# Patient Record
Sex: FEMALE | Race: WHITE | NOT HISPANIC OR LATINO | Employment: FULL TIME | URBAN - METROPOLITAN AREA
[De-identification: names, ages, dates, MRNs, and addresses within clinical notes are randomized per-mention and may not be internally consistent; named-entity substitution may affect disease eponyms.]

---

## 2017-01-09 ENCOUNTER — OFFICE VISIT (OUTPATIENT)
Dept: OBSTETRICS AND GYNECOLOGY | Facility: CLINIC | Age: 27
End: 2017-01-09
Payer: COMMERCIAL

## 2017-01-09 VITALS
SYSTOLIC BLOOD PRESSURE: 106 MMHG | DIASTOLIC BLOOD PRESSURE: 64 MMHG | WEIGHT: 161.81 LBS | HEIGHT: 70 IN | BODY MASS INDEX: 23.17 KG/M2

## 2017-01-09 DIAGNOSIS — N89.8 VAGINAL DISCHARGE: ICD-10-CM

## 2017-01-09 DIAGNOSIS — Z30.41 ENCOUNTER FOR SURVEILLANCE OF CONTRACEPTIVE PILLS: ICD-10-CM

## 2017-01-09 DIAGNOSIS — Z01.419 WOMEN'S ANNUAL ROUTINE GYNECOLOGICAL EXAMINATION: Primary | ICD-10-CM

## 2017-01-09 LAB
B-HCG UR QL: NEGATIVE
CTP QC/QA: YES

## 2017-01-09 PROCEDURE — 99213 OFFICE O/P EST LOW 20 MIN: CPT | Performed by: NURSE PRACTITIONER

## 2017-01-09 PROCEDURE — 81025 URINE PREGNANCY TEST: CPT | Mod: S$GLB,,, | Performed by: NURSE PRACTITIONER

## 2017-01-09 PROCEDURE — 99385 PREV VISIT NEW AGE 18-39: CPT | Mod: S$GLB,,, | Performed by: NURSE PRACTITIONER

## 2017-01-09 PROCEDURE — 87480 CANDIDA DNA DIR PROBE: CPT

## 2017-01-09 RX ORDER — NORETHINDRONE ACETATE AND ETHINYL ESTRADIOL .03; 1.5 MG/1; MG/1
1 TABLET ORAL DAILY
Qty: 28 EACH | Refills: 11 | Status: SHIPPED | OUTPATIENT
Start: 2017-01-09 | End: 2017-10-24 | Stop reason: SDUPTHER

## 2017-01-09 NOTE — PROGRESS NOTES
CC: Annual  HPI: Pt is a 26 y.o.  female who presents for routine annual exam. She uses OCPs for contraception. She does not want STD screening. 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 Wants to establish care with [employer] [facility]. C/o a vaginal itch. Denies odor. Denies abnormal vaginal discharge. Pt is on Loestrin-does not get cycles. Last pap WNL in 1/2016. No other issues or complaints.       ROS:  GENERAL: Feeling well overall. Denies fever or chills.   SKIN: Denies rash or lesions.   HEAD: Denies head injury or headache.   NODES: Denies enlarged lymph nodes.   CHEST: Denies chest pain or shortness of breath.   CARDIOVASCULAR: Denies palpitations or left sided chest pain.   ABDOMEN: No abdominal pain, constipation, diarrhea, nausea, vomiting or rectal bleeding.   URINARY: No dysuria, hematuria, or burning on urination.  REPRODUCTIVE: See HPI.   BREASTS: Denies pain, lumps, or nipple discharge.   HEMATOLOGIC: No easy bruisability or excessive bleeding.   MUSCULOSKELETAL: Denies joint pain or swelling.   NEUROLOGIC: Denies syncope or weakness.   PSYCHIATRIC: Denies depression, anxiety or mood swings.    PE:   APPEARANCE: Well nourished, well developed, White female in no acute distress.  NODES: no cervical, supraclavicular, or inguinal lymphadenopathy  BREASTS: Symmetrical, no skin changes or visible lesions. No palpable masses, nipple discharge or adenopathy bilaterally.  ABDOMEN: Soft. No tenderness or masses. No distention. No hernias palpated. No CVA tenderness.  VULVA: No lesions. Normal external female genitalia.  URETHRAL MEATUS: Normal size and location, no lesions, no prolapse.  URETHRA: No masses, tenderness, or prolapse.  VAGINA: Moist. No lesions or lacerations noted. + thick white discharge. No odor present.   CERVIX: No lesions or discharge. No cervical motion tenderness.   UTERUS: Normal size, regular shape, mobile, non-tender.  ADNEXA: No tenderness. No fullness or masses palpated in the adnexal regions.    ANUS PERINEUM: Normal.      Diagnosis:  1. Women's annual routine gynecological examination    2. Vaginal discharge    3. Encounter for surveillance of contraceptive pills        Plan:     Orders Placed This Encounter    Vaginosis Screen by DNA Probe    POCT urine pregnancy    norethindrone ac-eth estradiol (LOESTRIN 1.5/30, 21,) 1.5-30 mg-mcg Tab     UPT negative  Affirm pending  Discussed pap guidelines-due in 1/2019  Rx refill of OCPs  The use of the oral contraceptive has been fully discussed with the patient. This includes the proper method to initiate and continue the pills, the need for regular compliance to ensure adequate contraceptive effect, the physiology which make the pill effective, the instructions for what to do in event of a missed pill, and warnings about anticipated minor side effects such as breakthrough spotting, nausea, breast tenderness, weight changes, acne, headaches, etc.  She has been told of the more serious potential side effects such as MI, stroke, and deep vein thrombosis, all of which are very unlikely.  She has been asked to report any signs of such serious problems immediately.  She should back up the pill with a condom during any cycle in which antibiotics are prescribed, and during the first cycle as well. The need for additional protection, such as a condom, to prevent exposure to sexually transmitted diseases has also been discussed- the patient has been clearly reminded that OCP's cannot protect her against diseases such as HIV and others. She understands and wishes to take the medication as prescribed.     Patient was counseled today on the new ACS guidelines for cervical cytology screening as well as the current recommendations for breast cancer screening. She was counseled to follow up with her PCP for other routine health maintenance. Counseling session lasted approximately 10 minutes, and all her questions were answered.    Follow-up with me in 1 year for routine  exam; pap in 1 year.

## 2017-01-10 ENCOUNTER — TELEPHONE (OUTPATIENT)
Dept: OBSTETRICS AND GYNECOLOGY | Facility: CLINIC | Age: 27
End: 2017-01-10

## 2017-01-10 ENCOUNTER — PATIENT MESSAGE (OUTPATIENT)
Dept: OBSTETRICS AND GYNECOLOGY | Facility: CLINIC | Age: 27
End: 2017-01-10

## 2017-01-10 DIAGNOSIS — N76.0 BV (BACTERIAL VAGINOSIS): Primary | ICD-10-CM

## 2017-01-10 DIAGNOSIS — B96.89 BV (BACTERIAL VAGINOSIS): Primary | ICD-10-CM

## 2017-01-10 DIAGNOSIS — B37.31 VAGINAL YEAST INFECTION: ICD-10-CM

## 2017-01-10 LAB
CANDIDA RRNA VAG QL PROBE: POSITIVE
G VAGINALIS RRNA GENITAL QL PROBE: POSITIVE
T VAGINALIS RRNA GENITAL QL PROBE: NEGATIVE

## 2017-01-10 RX ORDER — TINIDAZOLE 500 MG/1
2 TABLET ORAL DAILY
Qty: 8 TABLET | Refills: 0 | Status: SHIPPED | OUTPATIENT
Start: 2017-01-10 | End: 2017-01-10 | Stop reason: CLARIF

## 2017-01-10 RX ORDER — TINIDAZOLE 500 MG/1
2 TABLET ORAL DAILY
Qty: 8 TABLET | Refills: 0 | Status: SHIPPED | OUTPATIENT
Start: 2017-01-10 | End: 2017-01-12

## 2017-01-10 RX ORDER — FLUCONAZOLE 150 MG/1
150 TABLET ORAL ONCE
Qty: 1 TABLET | Refills: 0 | Status: SHIPPED | OUTPATIENT
Start: 2017-01-10 | End: 2017-01-10

## 2017-01-10 RX ORDER — FLUCONAZOLE 150 MG/1
150 TABLET ORAL ONCE
Qty: 1 TABLET | Refills: 0 | Status: SHIPPED | OUTPATIENT
Start: 2017-01-10 | End: 2017-01-10 | Stop reason: CLARIF

## 2017-01-10 NOTE — TELEPHONE ENCOUNTER
----- Message from Lila Lino NP sent at 1/10/2017  8:32 AM CST -----  Regarding: rx to pharmacy  Please let pt know she is + for yeast and BV. I sent in Diflucan and tindamax to the [facility] on [address]. Take one pill of diflucan once. Take 4 pills of Tindamax on day 1 and four pills on day 2. No alcohol with Tindamax.     Thanks  Lila

## 2017-02-20 ENCOUNTER — HOSPITAL ENCOUNTER (OUTPATIENT)
Dept: RADIOLOGY | Facility: HOSPITAL | Age: 27
Discharge: HOME | End: 2017-02-20
Attending: FAMILY MEDICINE
Payer: COMMERCIAL

## 2017-02-20 ENCOUNTER — OFFICE VISIT (OUTPATIENT)
Dept: SPORTS MEDICINE | Facility: CLINIC | Age: 27
End: 2017-02-20
Payer: COMMERCIAL

## 2017-02-20 VITALS — HEIGHT: 70 IN | BODY MASS INDEX: 23.05 KG/M2 | WEIGHT: 161 LBS | TEMPERATURE: 98 F

## 2017-02-20 DIAGNOSIS — M25.562 LEFT KNEE PAIN, UNSPECIFIED CHRONICITY: ICD-10-CM

## 2017-02-20 DIAGNOSIS — M25.562 PATELLOFEMORAL ARTHRALGIA OF LEFT KNEE: Primary | ICD-10-CM

## 2017-02-20 PROCEDURE — 99999 PR PBB SHADOW E&M-EST. PATIENT-LVL III: CPT | Mod: PBBFAC,,, | Performed by: FAMILY MEDICINE

## 2017-02-20 PROCEDURE — 99203 OFFICE O/P NEW LOW 30 MIN: CPT | Mod: S$GLB,,, | Performed by: FAMILY MEDICINE

## 2017-02-20 PROCEDURE — 73564 X-RAY EXAM KNEE 4 OR MORE: CPT | Mod: TC,PO,LT

## 2017-02-20 PROCEDURE — 73562 X-RAY EXAM OF KNEE 3: CPT | Mod: 26,59,RT, | Performed by: RADIOLOGY

## 2017-02-20 PROCEDURE — 73564 X-RAY EXAM KNEE 4 OR MORE: CPT | Mod: 26,LT,, | Performed by: RADIOLOGY

## 2017-02-20 NOTE — MR AVS SNAPSHOT
[facility]  [address]  [facility] [address]  Phone: [phone]                  Norma Manjarrez   2017 2:30 PM   Appointment    Description:  Female : 1990   Provider:  Justus Venegas MD   Department:  [facility]                To Do List           Future Appointments        Provider Department Dept Phone    2017 2:30 PM Justus Venegas MD [facility] [phone]      Goals (5 Years of Data)     None      [facility]     [facility] Line -  Assistance  Registered nurses in the [facility] provide clinical advisement, health education, appointment booking, and other advisory services.  Call for this free service at [phone].             Medications           Message regarding Medications     Verify the changes and/or additions to your medication regime listed below are the same as discussed with your clinician today.  If any of these changes or additions are incorrect, please notify your healthcare provider.             Verify that the below list of medications is an accurate representation of the medications you are currently taking.  If none reported, the list may be blank. If incorrect, please contact your healthcare provider. Carry this list with you in case of emergency.           Current Medications     clonazePAM (KLONOPIN) 0.5 MG tablet Take 1 tablet (0.5 mg total) by mouth 2 (two) times daily as needed for Anxiety.    norethindrone ac-eth estradiol (LOESTRIN 1.5, 21,) 1.5-30 mg-mcg Tab Take 1 tablet by mouth once daily.    venlafaxine (EFFEXOR-XR) 37.5 MG 24 hr capsule Take 1 capsule (37.5 mg total) by mouth once daily.           Clinical Reference Information           Allergies as of 2017     No Known Allergies      Immunizations Administered on Date of Encounter - 2017     None      Language Assistance Services     ATTENTION: Language assistance  services are available, free of charge. Please call [phone].      ATENCIÓN: Si habla amor, tiene a veras disposición servicios gratuitos de asistencia lingüística. Llame al [phone].     CHÚ Ý: N?u b?n nói Ti?ng Vi?t, có các d?ch v? h? tr? ngôn ng? mi?n phí dành cho b?n. G?i s? [phone].         [facility] complies with applicable Federal civil rights laws and does not discriminate on the basis of race, color, national origin, age, disability, or sex.

## 2017-02-20 NOTE — PROGRESS NOTES
Norma Manjarrez, a 26 y.o. female, presents today for evaluation of her LEFT knee.      HISTORY OF PRESENT ILLNESS   Location: anterior knee, left  Onset: insidious, November 2016  Palliative:    Relative rest   Oral analgesics   Yoga   Self stretching   Cycling  Provocative:    Running  Prior: none  Progression: plateau discomfort  Quality:    Sharp when running   Ache day following a run   Radiation: none  Severity: per nursing documentation  Timing: intermittent w/ use  Trauma:    November 2016: ran half marathon --> pain following    Review of systems (ROS):  A 10+ review of systems was performed with pertinent positives and negatives noted above in the history of present illness. Other systems were negative unless otherwise specified.    PHYSICAL EXAMINATION  General:  The patient is alert and oriented x 3. Mood is pleasant. Observation of ears, eyes and nose reveal no gross abnormalities. HEENT: NCAT, sclera anicteric.   Lungs: Respirations are equal and unlabored.  Gait is coordinated. Patient can toe walk and heel walk without difficulty.    LEFT KNEE EXAMINATION    Observation/Inspection  Gait:   Nonantalgic   Alignment:  Neutral   Scars:   None   Muscle atrophy: Mild  Effusion:  None   Warmth:  None   Discoloration:   none     Tenderness / Crepitus (T / C):         T / C      T / C  Patella   - / -   Lateral joint line   - / -     Peripatellar medial  +  Medial joint line    - / -  Peripatellar lateral +  Medial plica   - / -  Patellar tendon +   Popliteal fossa   - / -  Quad tendon   -   Gastrocnemius   -  Prepatellar Bursa + / +   Quadricep   -  Tibial tubercle  -  Thigh/hamstring  -  Pes anserine/HS -  Fibula    -  ITB   - / -  Tibia     -  Tib/fib joint  - / -  LCL    -    MFC   - / -   MCL: Proximal  -    LFC   - / -   Distal    -          ROM: (* = pain)  PASSIVE   ACTIVE    Left :   5 / 0 / 145   5 / 0 / 145     Right :    5 / 0 / 145   5 / 0 / 145    Patellofemoral examination:  See  above noted areas of tenderness.   Patella position    Subluxation / dislocation: Centered        Sup. / Inf;   Normal   Crepitus (PF):    Absent   Patellar Mobility:       Medial-lateral:   Normal    Superior-inferior:  Normal    Inferior tilt   Normal    Patellar tendon:  Normal   Lateral tilt:    Normal   J-sign:     None   Patellofemoral grind:   No pain     Meniscal Signs:     Pain on terminal extension:  -  Pain on terminal flexion:  -  Micheles maneuver:  -  Squat     NT    Ligament Examination:  ACL / Lachman:  WNL  PCL-Post.  drawer: normal 0 to 2mm  MCL- Valgus:  normal 0 to 2mm  LCL- Varus:    normal 0 to 2mm  Pivot shift:  guarding   Dial Test:   difference c/w other side   At 30° flexion: normal (< 5°)    At 90° flexion: normal (< 5°)   Reverse Pivot Shift:   normal (Equal)     Strength: (* = with pain) Painful Side  Quadriceps   4/5  Hamstrin/5    Extremity Neuro-vascular Examination:   Sensation:  Grossly intact to light touch all dermatomal regions.   Motor Function:  Fully intact motor function at hip, knee, foot and ankle    DTRs;  quadriceps and  achilles 2+.  No clonus and downgoing Babinski.    Vascular status:  DP and PT pulses 2+, brisk capillary refill, symmetric.     Other Findings:    ASSESSMENT & PLAN  Assessment:   #1 PFS, left    No evidence of neurologic pathology  No evidence of vascular pathology    Imaging studies reviewed:   X-ray knee, bilateral 17.02    Plan:      We discussed options including:  #1 watchful waiting  #2 physical therapy aimed at:   Core stability   RoM knee   Strengthening quadriceps   Gait training   #3 injection therapy:   CSI iaknee     Right,     Left,    VSI iaknee    Right,     Left,    Orthobiologics   #4 MRI for further evaluation   #5 consultation      The patient chooses #2    Pain management: handout given  Bracing:    Patellar stability brace, consider  Physical therapy:    fPT, @ [facility], begin as above  Activity (e.g. 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  Ineffective-->consider MRI  Should symptoms worsen or fail to resolve, consider:  Revisiting the above options

## 2017-03-07 ENCOUNTER — CLINICAL SUPPORT (OUTPATIENT)
Dept: REHABILITATION | Facility: HOSPITAL | Age: 27
End: 2017-03-07
Attending: FAMILY MEDICINE
Payer: COMMERCIAL

## 2017-03-07 DIAGNOSIS — M25.562 LEFT KNEE PAIN, UNSPECIFIED CHRONICITY: ICD-10-CM

## 2017-03-07 PROCEDURE — 97110 THERAPEUTIC EXERCISES: CPT

## 2017-03-07 PROCEDURE — 97161 PT EVAL LOW COMPLEX 20 MIN: CPT

## 2017-03-07 NOTE — PROGRESS NOTES
Physical Therapy Evaluation    Name: Norma Thomas [place]  Clinic Number: [id number]      Diagnosis:   Encounter Diagnosis   Name Primary?    Left knee pain, unspecified chronicity      Physician: Justus Venegas, *  Treatment Orders: PT Eval and Treat    No past medical history on file.  Current Outpatient Prescriptions   Medication Sig    clonazePAM (KLONOPIN) 0.5 MG tablet Take 1 tablet (0.5 mg total) by mouth 2 (two) times daily as needed for Anxiety.    norethindrone ac-eth estradiol (LOESTRIN 1.5/30, 21,) 1.5-30 mg-mcg Tab Take 1 tablet by mouth once daily.    venlafaxine (EFFEXOR-XR) 37.5 MG 24 hr capsule Take 1 capsule (37.5 mg total) by mouth once daily.     No current facility-administered medications for this visit.      Review of patient's allergies indicates:  No Known Allergies  Precautions: N/A    Evaluation Date: 3/7/2017  Visit # authorized: 1/30  Authorization period: 12/31/2017    Subjective     Onset/GRIS: insidious    Primary concern/ Chief complaints:    Norma is a 26 y.o. female that presents to [facility] secondary to left knee pain.  Pt states in December she was training for a half marathon in January and was trying to increase her speed when her knee began hurting both during and after her runs.  She would push through it but eventually it was so severe she had to stop running.   X-ray was taken and revealed slight medial tibiofemoral joint space narrowing and right lateral patella tilt but no significant damage is noted. Pt uses ibuprofen and ice as needed to control symptoms but it has not really been bothering her lately as long as she does not run.  She tried a couple weeks ago to start again and it still hurt so she stopped.  She also cross-trains with biking, TRX, resistance training and occasionally yoga. Pt has a decreased ability to perform running as desired, stairs (especially descending) and has a race in April that she would really like  to run. Pt works as a  for [employer] and job related duties include a lot of walking around the facilities. Pt denies numbness and tingling in left LE. No cultural, environmental, or spiritual barriers identified to treatment or learning.    Pain Scale: Norma rates pain on a scale of 0-10 to be 5 at worst; 1 currently; 1 at best .    Aggravates: Descending stairs, running  Relieves: Rest, avoiding painful activities    Patient Goals: Return to running, hopefully in August    Objective     Observation: Patient ambulates with normal gait pattern and no evidence of pain.  Her shoes are [employer]ics with dual foam stability medially.    Posture:  Patient stands with slight femoral internal rotation bilaterally which corrects with glute activation.    Functional tests:    SL Squat: Significant knee valgus L>R, femoral internal rotation and instability noted.   DL Squat: Quad dominance with bilateral external rotation noted.   Step-down: Significant knee valgus L>R, femoral internal rotation and instability noted.  Unable to correct with cuing for glute control.   SLS EO: Good, (+) Trendelenburg, internal femoral rotation L>R.   SLS EC: Fair, less than 10s.    Range of Motion:   Knee Right active Left Active   Flexion WNL WNL   Extension WNL WNL     Lower Extremity Strength:  Right LE  Left LE    Quadriceps: 4+/5 Quadriceps: 4+/5   Hamstrings: 4/5 Hamstrings: 3+/5   Hip flexion (supine): 4/5 Hip flexion (supine): 4/5   Hip extension:  3/5 Hip extension: 3/5   Posterior Glute Med: 3/5 PGM:  3/5     Special Tests:   Right Left   Lateral Pull Sign (+) Slight (+)   Thessaly's Test (-) (-)   Patellar Grind Test (-) (-) pain, mild crepitus noted     Joint Mobility: Mildly decreased medial patellar glide left knee, normal superior/inferior.    Palpation: Mild point tenderness noted inferior medial border of patella.    Sensation: Intact.    Flexibility:    Ely's test: R = Normal ; L = Normal    Hamstrings: R = Normal  ; L = Slightly decreased    Delano's test: R = Normal ; L = Normal   Modified Ant test: R = Slightly decreased quad ; L = Slightly decreased quad    Edema: None noted.    Functional Limitations Reports   Category: Mobility  Tool: FOTO Knee 66%    PT Evaluation Completed? Yes  Discussed Plan of Care with patient: Yes    TREATMENT:  Norma received therapeutic exercises to develop strength and endurance, flexibility for 30 minutes including:   SLR w/ ER 20xB  Sidelying Abd 20xB  Supine Clam GTB 20x  Sidelying Clam GTB 20xB  SL Bridge 5sh x 20B    Pt. Received cold pack with compression x 10 min following treatment.    Instructed pt. regarding: Proper technique with all exercises. Pt demonstrated good understanding of the education provided. Norma demonstrated good return demonstration of activities.    Assessment     This is a 26 y.o. female referred to outpatient physical therapy and presents with a medical diagnosis of left knee pain.  Patient presents with signs and symptoms consistent with patellofemoral dysfunction exacerbated by lateral patellar compression caused by poor hip/glute strength and control with running.  Patient's chief complaint is inability to run.  Patient will benefit from skilled physical therapy services, specifically gross lower extremity strengthening, improved dynamic balance and proprioception, progressing to running analysis as tolerated with the resolution of pain. The following goals were discussed with the patient and patient is in agreement with them as to be addressed in the treatment plan. Patient was given a HEP consisting of exercises listed above. Patient verbally understood the instructions as they were given and demonstrated proper form and technique during therapy. Pt was advised to perform these exercises free of pain, and to stop performing them if pain occurs.     History  Co-morbidities and personal factors that may impact the plan of care Examination  Body  Structures and Functions, activity limitations and participation restrictions that may impact the plan of care Clinical Presentation   Decision Making/ Complexity Score   Co-morbidities:   N/A              Personal Factors:   N/A Body Regions:  Knee and Hip    Body Systems:   Weakness  Decreased balance/proprioception    Activity limitations:   Stairs    Participation Restrictions:   Unable to run       Uncomplicated/Stable   Low       Medical necessity is demonstrated by the following IMPAIRMENTS/PROBLEM LIST:   1)Increase in pain level limiting function   2)Decreased strength   3)Decreased dynamic balance/proprioception   4)Decreased ability to run   5)Lack of HEP    GOALS: Short Term Goals:  4-6 weeks  1.  Report decreased left knee pain  < / =  2/10  to increase tolerance for return to running progression.  2.  Demonstrate single limb squat with proper biomechanics in order to safely return to daily and recreational activities.  3.  Increase strength to >/= 4+/5 in gross bilateral LEs to increase tolerance for return to running.  4.  Pt to tolerate HEP to improve strength and return to running and recreational fitness activities as tolerated.    Plan     Pt will be treated by physical therapy 1-2 times a week for Pt Education, HEP, therapeutic exercises, neuromuscular re-education, manual therapy, joint mobilizations, modalities prn to achieve established goals. Norma may at times be seen by a PTA as part of the Rehab Team.     Cont PT for 4-6 weeks.     Saira Cabrera, PT, DPT        I certify the need for these services furnished under this plan of treatment and while under my care.______________________________ Physician/Referring Practitioner  Date of Signature

## 2017-03-13 ENCOUNTER — PATIENT MESSAGE (OUTPATIENT)
Dept: OBSTETRICS AND GYNECOLOGY | Facility: CLINIC | Age: 27
End: 2017-03-13

## 2017-03-14 ENCOUNTER — CLINICAL SUPPORT (OUTPATIENT)
Dept: REHABILITATION | Facility: HOSPITAL | Age: 27
End: 2017-03-14
Attending: FAMILY MEDICINE
Payer: COMMERCIAL

## 2017-03-14 DIAGNOSIS — M25.562 LEFT KNEE PAIN, UNSPECIFIED CHRONICITY: ICD-10-CM

## 2017-03-14 PROCEDURE — 97110 THERAPEUTIC EXERCISES: CPT

## 2017-03-14 NOTE — PROGRESS NOTES
"Physical Therapy Visit Note    Name: Norma Thomas [place]  Clinic Number: [id number]      Diagnosis:   Encounter Diagnosis   Name Primary?    Left knee pain, unspecified chronicity      Physician: Justus Venegas, *  Treatment Orders: PT Eval and Treat    No past medical history on file.  Current Outpatient Prescriptions   Medication Sig    clonazePAM (KLONOPIN) 0.5 MG tablet Take 1 tablet (0.5 mg total) by mouth 2 (two) times daily as needed for Anxiety.    norethindrone ac-eth estradiol (LOESTRIN 1.5/30, 21,) 1.5-30 mg-mcg Tab Take 1 tablet by mouth once daily.    venlafaxine (EFFEXOR-XR) 37.5 MG 24 hr capsule Take 1 capsule (37.5 mg total) by mouth once daily.     No current facility-administered medications for this visit.      Review of patient's allergies indicates:  No Known Allergies  Precautions: N/A    Evaluation Date: 3/14/2017  Visit # authorized: 2/30  Authorization period: 12/31/2017  Time In: 730  Time Out: 830    Subjective     Patient states her knee was a little sore last week but she thinks she worked out too hard.  It is feeling fine today and her exercises are going well.    Patient Goals: Return to running, hopefully in August    Objective     Functional Limitations Reports   Category: Mobility  Tool: FOTO Knee 66%    TREATMENT:  Norma received therapeutic exercises to develop strength and endurance, flexibility for 55 minutes including:   Bike L3/10'  SL Clam BTB 10x2B  Sidestepping BTB 40'x2 Straight/Bent  Partial Range Shuttle Squats 75# 20x2  3" Lateral Step Downs 20xB (cues for proper glute control)  TKE Ball/Wall 5sh x 20B  Standing HS Stretch 2'B  Standing Gastroc Stretch 2'B    Pt. Received cold pack with compression x 10 min following treatment. (Patient refused--not today)    Instructed pt. regarding: Proper technique with all exercises. Pt demonstrated good understanding of the education provided. Norma demonstrated good return demonstration of " activities.    Assessment     Today's Assessment:  Patient stated she was a little sore after today's treatment but refused ice.  She required mod cues for proper glute control for internal femoral rotation.  Progress as tolerated.    This is a 26 y.o. female referred to outpatient physical therapy and presents with a medical diagnosis of left knee pain.  Patient presents with signs and symptoms consistent with patellofemoral dysfunction exacerbated by lateral patellar compression caused by poor hip/glute strength and control with running.  Patient's chief complaint is inability to run.  Patient will benefit from skilled physical therapy services, specifically gross lower extremity strengthening, improved dynamic balance and proprioception, progressing to running analysis as tolerated with the resolution of pain.     Medical necessity is demonstrated by the following IMPAIRMENTS/PROBLEM LIST:   1)Increase in pain level limiting function   2)Decreased strength   3)Decreased dynamic balance/proprioception   4)Decreased ability to run   5)Lack of HEP    GOALS: Short Term Goals:  4-6 weeks  1.  Report decreased left knee pain  < / =  2/10  to increase tolerance for return to running progression.  2.  Demonstrate single limb squat with proper biomechanics in order to safely return to daily and recreational activities.  3.  Increase strength to >/= 4+/5 in gross bilateral LEs to increase tolerance for return to running.  4.  Pt to tolerate HEP to improve strength and return to running and recreational fitness activities as tolerated.    Plan     Pt will be treated by physical therapy 1-2 times a week for Pt Education, HEP, therapeutic exercises, neuromuscular re-education, manual therapy, joint mobilizations, modalities prn to achieve established goals. Norma may at times be seen by a PTA as part of the Rehab Team.     Cont PT for 4-6 weeks.     Saira Cabrera, PT, DPT        I certify the need for these services  furnished under this plan of treatment and while under my care.______________________________ Physician/Referring Practitioner  Date of Signature

## 2017-03-21 ENCOUNTER — CLINICAL SUPPORT (OUTPATIENT)
Dept: REHABILITATION | Facility: HOSPITAL | Age: 27
End: 2017-03-21
Attending: FAMILY MEDICINE
Payer: COMMERCIAL

## 2017-03-21 DIAGNOSIS — M25.562 LEFT KNEE PAIN, UNSPECIFIED CHRONICITY: ICD-10-CM

## 2017-03-21 PROCEDURE — 97110 THERAPEUTIC EXERCISES: CPT

## 2017-03-21 NOTE — PROGRESS NOTES
"Physical Therapy Visit Note    Name: Norma Thomas [place]  Clinic Number: [id number]      Diagnosis:   Encounter Diagnosis   Name Primary?    Left knee pain, unspecified chronicity      Physician: Justus Venegas, *  Treatment Orders: PT Eval and Treat    No past medical history on file.  Current Outpatient Prescriptions   Medication Sig    clonazePAM (KLONOPIN) 0.5 MG tablet Take 1 tablet (0.5 mg total) by mouth 2 (two) times daily as needed for Anxiety.    norethindrone ac-eth estradiol (LOESTRIN 1.5/30, 21,) 1.5-30 mg-mcg Tab Take 1 tablet by mouth once daily.    venlafaxine (EFFEXOR-XR) 37.5 MG 24 hr capsule Take 1 capsule (37.5 mg total) by mouth once daily.     No current facility-administered medications for this visit.      Review of patient's allergies indicates:  No Known Allergies  Precautions: N/A    Today's Date:  3/21/2017  Evaluation Date: 3/14/2017  Visit # authorized: 3/30  Authorization period: 12/31/2017  Time In: 730  Time Out: 830    Subjective     Patient states she went to a workout class on Sunday that ended up being a lot of running so she did it and it has been sore since then but not too bad.  Today she can feel it lateral to patella but not super painful.    Patient Goals: Return to running, hopefully in August    Objective     Functional Limitations Reports   Category: Mobility  Tool: FOTO Knee 66%    TREATMENT:  Norma received therapeutic exercises to develop strength and endurance, flexibility for 55 minutes including:   Bike L5/10'  Shuttle Squats 75# SL 20x2 (very fatigued)  4" Lateral Step Downs 20xB (cues for proper glute control)  4" Forward Step Downs 20xB   Sidestepping BTB 40'x2 Straight/Bent  Monster Walks BTB 40x2 Fwd/Bkwd  SLS on Foam as tolerated  SLS on Foam 3-way ball toss 20x ea.    Patient took ice to go.    Instructed pt. regarding: Proper technique with all exercises. Pt demonstrated good understanding of the education provided. Norma demonstrated " good return demonstration of activities.    Assessment     Today's Assessment:  Patient stated she was a little sore after today's treatment and took ice to go.  She continues to require a few cues for proper LE alignment.    This is a 26 y.o. female referred to outpatient physical therapy and presents with a medical diagnosis of left knee pain.  Patient presents with signs and symptoms consistent with patellofemoral dysfunction exacerbated by lateral patellar compression caused by poor hip/glute strength and control with running.  Patient's chief complaint is inability to run.  Patient will benefit from skilled physical therapy services, specifically gross lower extremity strengthening, improved dynamic balance and proprioception, progressing to running analysis as tolerated with the resolution of pain.     Medical necessity is demonstrated by the following IMPAIRMENTS/PROBLEM LIST:   1)Increase in pain level limiting function   2)Decreased strength   3)Decreased dynamic balance/proprioception   4)Decreased ability to run   5)Lack of HEP    GOALS: Short Term Goals:  4-6 weeks  1.  Report decreased left knee pain  < / =  2/10  to increase tolerance for return to running progression.  2.  Demonstrate single limb squat with proper biomechanics in order to safely return to daily and recreational activities.  3.  Increase strength to >/= 4+/5 in gross bilateral LEs to increase tolerance for return to running.  4.  Pt to tolerate HEP to improve strength and return to running and recreational fitness activities as tolerated.    Plan     Pt will be treated by physical therapy 1-2 times a week for Pt Education, HEP, therapeutic exercises, neuromuscular re-education, manual therapy, joint mobilizations, modalities prn to achieve established goals. Norma may at times be seen by a PTA as part of the Rehab Team.     Cont PT for 4-6 weeks.     Saira Cabrera, PT, DPT        I certify the need for these services furnished  under this plan of treatment and while under my care.______________________________ Physician/Referring Practitioner  Date of Signature

## 2017-03-23 ENCOUNTER — CLINICAL SUPPORT (OUTPATIENT)
Dept: REHABILITATION | Facility: HOSPITAL | Age: 27
End: 2017-03-23
Attending: FAMILY MEDICINE
Payer: COMMERCIAL

## 2017-03-23 DIAGNOSIS — M25.562 LEFT KNEE PAIN, UNSPECIFIED CHRONICITY: ICD-10-CM

## 2017-03-23 PROCEDURE — 97110 THERAPEUTIC EXERCISES: CPT

## 2017-03-23 NOTE — PROGRESS NOTES
"Physical Therapy Visit Note    Name: Norma Thomas [place]  Clinic Number: [id number]      Diagnosis:   Encounter Diagnosis   Name Primary?    Left knee pain, unspecified chronicity      Physician: Justus Venegas, *  Treatment Orders: PT Eval and Treat    No past medical history on file.  Current Outpatient Prescriptions   Medication Sig    clonazePAM (KLONOPIN) 0.5 MG tablet Take 1 tablet (0.5 mg total) by mouth 2 (two) times daily as needed for Anxiety.    norethindrone ac-eth estradiol (LOESTRIN 1.5/30, 21,) 1.5-30 mg-mcg Tab Take 1 tablet by mouth once daily.    venlafaxine (EFFEXOR-XR) 37.5 MG 24 hr capsule Take 1 capsule (37.5 mg total) by mouth once daily.     No current facility-administered medications for this visit.      Review of patient's allergies indicates:  No Known Allergies  Precautions: N/A    Today's Date:  3/23/2017  Evaluation Date: 3/14/2017  Visit # authorized: 4/30  Authorization period: 12/31/2017  Time In: 730  Time Out: 830    Subjective     Patient states her knee was achy after last visit and just felt fatigued but not painful.      Patient Goals: Return to running, hopefully in August    Objective     Functional Limitations Reports   Category: Mobility  Tool: FOTO Knee 66%    TREATMENT:  Norma received therapeutic exercises to develop strength and endurance, flexibility for 55 minutes including:   Bike L5/10'  Standing HS/Gastroc/Hip Flexor Stretch 2'B ea.  SL Bridges 20xB  Bridge w/ Alt Kick 2'  Side Planks w/ Abduction to fatigue x 3B  Side Planks w/ dips to fatigue x 3B  Wall Sits w/ BTB 45"x3 (cues for even weight bearing)  4" Lateral Step Downs 20xB (cues for proper glute control)  Monster Walks BTB 40'x2  SLS on Foam 3-way ball toss 20xB ea.    Not today:  Shuttle Squats 75# SL 20x2 (very fatigued)  4" Forward Step Downs 20xB   Sidestepping BTB 40'x2 Straight/Bent  Monster Walks BTB 40x2 Fwd/Bkwd  SLS on Foam as tolerated    Patient took ice to go.    Instructed " pt. regarding: Proper technique with all exercises. Pt demonstrated good understanding of the education provided. Norma demonstrated good return demonstration of activities.    Assessment     Today's Assessment:  Patient stated she was a little sore after today's treatment and took ice to go.  She continues to require a few cues for proper LE alignment.    This is a 26 y.o. female referred to outpatient physical therapy and presents with a medical diagnosis of left knee pain.  Patient presents with signs and symptoms consistent with patellofemoral dysfunction exacerbated by lateral patellar compression caused by poor hip/glute strength and control with running.  Patient's chief complaint is inability to run.  Patient will benefit from skilled physical therapy services, specifically gross lower extremity strengthening, improved dynamic balance and proprioception, progressing to running analysis as tolerated with the resolution of pain.     Medical necessity is demonstrated by the following IMPAIRMENTS/PROBLEM LIST:   1)Increase in pain level limiting function   2)Decreased strength   3)Decreased dynamic balance/proprioception   4)Decreased ability to run   5)Lack of HEP    GOALS: Short Term Goals:  4-6 weeks  1.  Report decreased left knee pain  < / =  2/10  to increase tolerance for return to running progression.  2.  Demonstrate single limb squat with proper biomechanics in order to safely return to daily and recreational activities.  3.  Increase strength to >/= 4+/5 in gross bilateral LEs to increase tolerance for return to running.  4.  Pt to tolerate HEP to improve strength and return to running and recreational fitness activities as tolerated.    Plan     Pt will be treated by physical therapy 1-2 times a week for Pt Education, HEP, therapeutic exercises, neuromuscular re-education, manual therapy, joint mobilizations, modalities prn to achieve established goals. Norma may at times be seen by a PTA as  part of the Rehab Team.     Cont PT for 4-6 weeks.     Saira Cabrera, PT, DPT        I certify the need for these services furnished under this plan of treatment and while under my care.______________________________ Physician/Referring Practitioner  Date of Signature

## 2017-03-27 ENCOUNTER — CLINICAL SUPPORT (OUTPATIENT)
Dept: REHABILITATION | Facility: HOSPITAL | Age: 27
End: 2017-03-27
Attending: FAMILY MEDICINE
Payer: COMMERCIAL

## 2017-03-27 DIAGNOSIS — M25.562 LEFT KNEE PAIN, UNSPECIFIED CHRONICITY: Primary | ICD-10-CM

## 2017-03-27 PROCEDURE — 97110 THERAPEUTIC EXERCISES: CPT

## 2017-03-27 NOTE — PROGRESS NOTES
"Physical Therapy Visit Note    Name: Norma Thomas [place]  Clinic Number: [id number]      Diagnosis:   Encounter Diagnosis   Name Primary?    Left knee pain, unspecified chronicity Yes     Physician: Justus Venegas, *  Treatment Orders: PT Eval and Treat    No past medical history on file.  Current Outpatient Prescriptions   Medication Sig    clonazePAM (KLONOPIN) 0.5 MG tablet Take 1 tablet (0.5 mg total) by mouth 2 (two) times daily as needed for Anxiety.    norethindrone ac-eth estradiol (LOESTRIN 1.5/30, 21,) 1.5-30 mg-mcg Tab Take 1 tablet by mouth once daily.    venlafaxine (EFFEXOR-XR) 37.5 MG 24 hr capsule Take 1 capsule (37.5 mg total) by mouth once daily.     No current facility-administered medications for this visit.      Review of patient's allergies indicates:  No Known Allergies  Precautions: N/A    Today's Date:  3/23/2017  Evaluation Date: 3/14/2017  Visit # authorized: 5/30  Authorization period: 12/31/2017  Time In: 5:00  Time Out: 6:06    Subjective     Pt reports w/ no c/o pn in L  Knee.      Patient Goals: Return to running, hopefully in August    Objective     Functional Limitations Reports   Category: Mobility  Tool: FOTO Knee 70% 3/27/17    TREATMENT:  Norma received therapeutic exercises to develop strength and endurance, flexibility for 50 minutes including:     Bike L5/5'  Standing HS/Gastroc/Hip Flexor Stretch 2'B ea.  SL Bridges 20xB NP  Bridge w/ Alt Kick 2'  Side Planks w/ Abduction to fatigue x 3B  Side Planks w/ dips to fatigue x 3B  Wall Sits w/ BTB 45"x3 (cues for even weight bearing) NP  4" Lateral Step Downs 30xB (cues for proper glute control)  Monster Walks MTB 40'x2 Fwd/ Bwd   Lat step 1 lap MTB  SLS on Foam 3-way ball toss 20x B ea.    Not today:  Shuttle Squats 75# SL 20x2 (very fatigued)  4" Forward Step Downs 20xB   Sidestepping BTB 40'x2 Straight/Bent  Monster Walks BTB 40x2 Fwd/Bkwd  SLS on Foam as tolerated    CP x 10 min    Instructed pt. regarding: " Proper technique with all exercises. Pt demonstrated good understanding of the education provided. Norma demonstrated good return demonstration of activities.    Assessment     Pt courtney tx well w/ no c/o pn.  Pt demonstrated increased strength during therex w/ VCs for technique.  Pt instructed cont HEP.  Cont to progress as courtney.      This is a 26 y.o. female referred to outpatient physical therapy and presents with a medical diagnosis of left knee pain.  Patient presents with signs and symptoms consistent with patellofemoral dysfunction exacerbated by lateral patellar compression caused by poor hip/glute strength and control with running.  Patient's chief complaint is inability to run.  Patient will benefit from skilled physical therapy services, specifically gross lower extremity strengthening, improved dynamic balance and proprioception, progressing to running analysis as tolerated with the resolution of pain.     Medical necessity is demonstrated by the following IMPAIRMENTS/PROBLEM LIST:   1)Increase in pain level limiting function   2)Decreased strength   3)Decreased dynamic balance/proprioception   4)Decreased ability to run   5)Lack of HEP    GOALS: Short Term Goals:  4-6 weeks  1.  Report decreased left knee pain  < / =  2/10  to increase tolerance for return to running progression.  2.  Demonstrate single limb squat with proper biomechanics in order to safely return to daily and recreational activities.  3.  Increase strength to >/= 4+/5 in gross bilateral LEs to increase tolerance for return to running.  4.  Pt to tolerate HEP to improve strength and return to running and recreational fitness activities as tolerated.    Plan     Pt will be treated by physical therapy 1-2 times a week for Pt Education, HEP, therapeutic exercises, neuromuscular re-education, manual therapy, joint mobilizations, modalities prn to achieve established goals. Norma may at times be seen by a PTA as part of the Rehab Team.      Cont PT for 4-6 weeks.     Roberth Manzano, PTA

## 2017-03-28 ENCOUNTER — OFFICE VISIT (OUTPATIENT)
Dept: SPORTS MEDICINE | Facility: CLINIC | Age: 27
End: 2017-03-28
Payer: COMMERCIAL

## 2017-03-28 VITALS — HEIGHT: 70 IN | BODY MASS INDEX: 23.05 KG/M2 | WEIGHT: 161 LBS | TEMPERATURE: 98 F

## 2017-03-28 DIAGNOSIS — M25.562 PATELLOFEMORAL ARTHRALGIA OF LEFT KNEE: Primary | ICD-10-CM

## 2017-03-28 PROCEDURE — 99214 OFFICE O/P EST MOD 30 MIN: CPT | Mod: S$GLB,,, | Performed by: FAMILY MEDICINE

## 2017-03-28 PROCEDURE — 99999 PR PBB SHADOW E&M-EST. PATIENT-LVL III: CPT | Mod: PBBFAC,,, | Performed by: FAMILY MEDICINE

## 2017-03-28 NOTE — PROGRESS NOTES
Norma Manjarrez, a 26 y.o. female, presents today for evaluation of her LEFT knee.      HISTORY OF PRESENT ILLNESS   Location: anterior knee, left  Onset: insidious, November 2016  Palliative:    Relative rest   Oral analgesics   Yoga   Self stretching   Cycling   fPT @ [facility], Saira Cabrera, going well   Provocative:    Running  Prior: none  Progression: resolving discomfort  Quality:    Sharp when running   Ache day following a run   Radiation: none  Severity: per nursing documentation  Timing: intermittent w/ use  Trauma:    November 2016: ran half marathon --> pain following    Review of systems (ROS):  A 10+ review of systems was performed with pertinent positives and negatives noted above in the history of present illness. Other systems were negative unless otherwise specified.    PHYSICAL EXAMINATION  General:  The patient is alert and oriented x 3. Mood is pleasant. Observation of ears, eyes and nose reveal no gross abnormalities. HEENT: NCAT, sclera anicteric.   Lungs: Respirations are equal and unlabored.  Gait is coordinated. Patient can toe walk and heel walk without difficulty.    LEFT KNEE EXAMINATION    Observation/Inspection  Gait:   Nonantalgic   Alignment:  Neutral   Scars:   None   Muscle atrophy: Mild  Effusion:  None   Warmth:  None   Discoloration:   none     Tenderness / Crepitus (T / C):         T / C      T / C  Patella   - / -   Lateral joint line   - / -     Peripatellar medial  +  Medial joint line    - / -  Peripatellar lateral +  Medial plica   - / -  Patellar tendon +   Popliteal fossa   - / -  Quad tendon   -   Gastrocnemius   -  Prepatellar Bursa + / +   Quadricep   -  Tibial tubercle  -  Thigh/hamstring  -  Pes anserine/HS -  Fibula    -  ITB   - / -  Tibia     -  Tib/fib joint  - / -  LCL    -    MFC   - / -   MCL: Proximal  -    LFC   - / -   Distal    -          ROM: (* = pain)  PASSIVE   ACTIVE    Left :   5 / 0 / 145   5 / 0 / 145     Right :    5 / 0 / 145   5 / 0  / 145    Patellofemoral examination:  See above noted areas of tenderness.   Patella position    Subluxation / dislocation: Centered        Sup. / Inf;   Normal   Crepitus (PF):    Absent   Patellar Mobility:       Medial-lateral:   Normal    Superior-inferior:  Normal    Inferior tilt   Normal    Patellar tendon:  Normal   Lateral tilt:    Normal   J-sign:     None   Patellofemoral grind:   No pain     Meniscal Signs:     Pain on terminal extension:  -  Pain on terminal flexion:  -  Micheles maneuver:  -  Squat     NT    Ligament Examination:  ACL / Lachman:  WNL  PCL-Post.  drawer: normal 0 to 2mm  MCL- Valgus:  normal 0 to 2mm  LCL- Varus:    normal 0 to 2mm  Pivot shift:  guarding   Dial Test:   difference c/w other side   At 30° flexion: normal (< 5°)    At 90° flexion: normal (< 5°)   Reverse Pivot Shift:   normal (Equal)     Strength: (* = with pain) Painful Side  Quadriceps   4/5  Hamstrin/5    Extremity Neuro-vascular Examination:   Sensation:  Grossly intact to light touch all dermatomal regions.   Motor Function:  Fully intact motor function at hip, knee, foot and ankle    DTRs;  quadriceps and  achilles 2+.  No clonus and downgoing Babinski.    Vascular status:  DP and PT pulses 2+, brisk capillary refill, symmetric.     Other Findings:    ASSESSMENT & PLAN  Assessment:   #1 PFS, left    No evidence of neurologic pathology  No evidence of vascular pathology    Imaging studies reviewed:   X-ray knee, bilateral 17.02    Plan:    Discussed full course of fPT vs. hgPT and PT w/     We discussed options including:  #1 watchful waiting  #2 conclude physical therapy aimed at:   Core stability   RoM knee   Strengthening quadriceps   Gait training   #3 injection therapy:   CSI iaknee     Right,     Left,    VSI iaknee    Right,     Left,    Orthobiologics   #4 MRI for further evaluation   #5 consultation      The patient chooses #2    Pain management: handout given  Bracing:    Patellar  stability brace, consider  Physical therapy:    fPT, @ [facility], continue as above  Activity (e.g. sports, work) restrictions: as tolerated   school/vocation: [employer], 1/2 marathons     Follow up in 3 w  How was 04/17 10k?  Should symptoms worsen or fail to resolve, consider:  Revisiting the above options

## 2017-03-28 NOTE — MR AVS SNAPSHOT
[facility]  [address]  [facility] [address]  Phone: [phone]                  Norma Manjarrez   3/28/2017 8:15 AM   Appointment    Description:  Female : 1990   Provider:  Justus Venegas MD   Department:  [facility]                To Do List           Future Appointments        Provider Department Dept Phone    3/28/2017 8:15 AM Justus Venegas MD [facility] [phone]    3/30/2017 7:30 AM Saira Cabrera PT [facility] [phone]      Goals (5 Years of Data)     None      [facility]     [facility] Nurse Care Line -  Assistance  Registered nurses in the [facility] provide clinical advisement, health education, appointment booking, and other advisory services.  Call for this free service at [phone].             Medications           Message regarding Medications     Verify the changes and/or additions to your medication regime listed below are the same as discussed with your clinician today.  If any of these changes or additions are incorrect, please notify your healthcare provider.             Verify that the below list of medications is an accurate representation of the medications you are currently taking.  If none reported, the list may be blank. If incorrect, please contact your healthcare provider. Carry this list with you in case of emergency.           Current Medications     clonazePAM (KLONOPIN) 0.5 MG tablet Take 1 tablet (0.5 mg total) by mouth 2 (two) times daily as needed for Anxiety.    norethindrone ac-eth estradiol (LOESTRIN 1.5, 21,) 1.5-30 mg-mcg Tab Take 1 tablet by mouth once daily.    venlafaxine (EFFEXOR-XR) 37.5 MG 24 hr capsule Take 1 capsule (37.5 mg total) by mouth once daily.           Clinical Reference Information           Allergies as of 3/28/2017     No Known Allergies      Immunizations Administered on Date of Encounter -  3/28/2017     None      Language Assistance Services     ATTENTION: Language assistance services are available, free of charge. Please call [phone].      ATENCIÓN: Si habla amor, tiene a veras disposición servicios gratuitos de asistencia lingüística. Llame al [phone].     CHÚ Ý: N?u b?n nói Ti?ng Vi?t, có các d?ch v? h? tr? ngôn ng? mi?n phí dành cho b?n. G?i s? [phone].         [facility] complies with applicable Federal civil rights laws and does not discriminate on the basis of race, color, national origin, age, disability, or sex.

## 2017-03-30 ENCOUNTER — CLINICAL SUPPORT (OUTPATIENT)
Dept: REHABILITATION | Facility: HOSPITAL | Age: 27
End: 2017-03-30
Attending: FAMILY MEDICINE
Payer: COMMERCIAL

## 2017-03-30 DIAGNOSIS — M25.562 LEFT KNEE PAIN, UNSPECIFIED CHRONICITY: ICD-10-CM

## 2017-03-30 PROCEDURE — 97110 THERAPEUTIC EXERCISES: CPT

## 2017-03-30 NOTE — PROGRESS NOTES
"Physical Therapy Visit Note    Name: Norma Thomas [place]  Clinic Number: [id number]      Diagnosis:   Encounter Diagnosis   Name Primary?    Left knee pain, unspecified chronicity      Physician: Justus Venegas, *  Treatment Orders: PT Eval and Treat    No past medical history on file.  Current Outpatient Prescriptions   Medication Sig    clonazePAM (KLONOPIN) 0.5 MG tablet Take 1 tablet (0.5 mg total) by mouth 2 (two) times daily as needed for Anxiety.    norethindrone ac-eth estradiol (LOESTRIN 1.5/30, 21,) 1.5-30 mg-mcg Tab Take 1 tablet by mouth once daily.    venlafaxine (EFFEXOR-XR) 37.5 MG 24 hr capsule Take 1 capsule (37.5 mg total) by mouth once daily.     No current facility-administered medications for this visit.      Review of patient's allergies indicates:  No Known Allergies  Precautions: N/A    Today's Date:  3/30/2017  Evaluation Date: 3/14/2017  Visit # authorized: 6/30  Authorization period: 12/31/2017  Time In: 730  Time Out: 830    Subjective     Patient states she saw MD who said she could run so last night she did a slow 3 mile run and had no pain but just achiness in the middle of the run.    Patient Goals: Return to running, hopefully in August    Objective     Functional Limitations Reports   Category: Mobility  Tool: FOTO Knee 70% 3/27/17    TREATMENT:  Norma received therapeutic exercises to develop strength and endurance, flexibility for 45 minutes including:   Bike L5/10'  Standing HS/Gastroc/Hip Flexor Stretch 2'B ea.  SL Bridges 5sh x 20, 20x fast B  Shuttle Squats 75# SL 20x2 (painfree range only)  Treadmill Running Analysis w/ video and discussion followed by practice 20' (forward trunk lean, arm swing, maya 180 to decrease heel strike and encourage midfoot strike to decrease knee stress)    Not today:  Bridge w/ Alt Kick 2'  Side Planks w/ Abduction to fatigue x 3B  Side Planks w/ dips to fatigue x 3B  Wall Sits w/ BTB 45"x3 (cues for even weight bearing) " "NP  4" Lateral Step Downs 30xB (cues for proper glute control)  Monster Walks MTB 40'x2 Fwd/ Bwd   Lat step 1 lap MTB  SLS on Foam 3-way ball toss 20x B ea.  4" Forward Step Downs 20xB   Sidestepping BTB 40'x2 Straight/Bent  Monster Walks BTB 40x2 Fwd/Bkwd  SLS on Foam as tolerated    CP x 10 min    Instructed pt. regarding: Proper technique with all exercises. Pt demonstrated good understanding of the education provided. Norma demonstrated good return demonstration of activities.    Assessment     Discussed patient's running technique as above with video--her form improved substantially with small changes; discussed that she should work on this for a couple weeks and keep up with HEP then return for follow-up in 2 weeks.  Re-assess running form at that time.     This is a 26 y.o. female referred to outpatient physical therapy and presents with a medical diagnosis of left knee pain.  Patient presents with signs and symptoms consistent with patellofemoral dysfunction exacerbated by lateral patellar compression caused by poor hip/glute strength and control with running.  Patient's chief complaint is inability to run.  Patient will benefit from skilled physical therapy services, specifically gross lower extremity strengthening, improved dynamic balance and proprioception, progressing to running analysis as tolerated with the resolution of pain.     Medical necessity is demonstrated by the following IMPAIRMENTS/PROBLEM LIST:   1)Increase in pain level limiting function   2)Decreased strength   3)Decreased dynamic balance/proprioception   4)Decreased ability to run   5)Lack of HEP    GOALS: Short Term Goals:  4-6 weeks  1.  Report decreased left knee pain  < / =  2/10  to increase tolerance for return to running progression.  Achieved 3/30/2017.  2.  Demonstrate single limb squat with proper biomechanics in order to safely return to daily and recreational activities.  Achieved 3/30/2017.  3.  Increase strength to >/= " 4+/5 in gross bilateral LEs to increase tolerance for return to running.  Achieved 3/30/2017.  4.  Pt to tolerate HEP to improve strength and return to running and recreational fitness activities as tolerated.  Achieved 3/30/2017.    Plan     Pt will be treated by physical therapy 1-2 times a week for Pt Education, HEP, therapeutic exercises, neuromuscular re-education, manual therapy, joint mobilizations, modalities prn to achieve established goals. Norma may at times be seen by a PTA as part of the Rehab Team.     Cont PT for 4-6 weeks.     Saira Cabrera, PT, DPT

## 2017-04-19 ENCOUNTER — CLINICAL SUPPORT (OUTPATIENT)
Dept: REHABILITATION | Facility: HOSPITAL | Age: 27
End: 2017-04-19
Attending: FAMILY MEDICINE
Payer: COMMERCIAL

## 2017-04-19 DIAGNOSIS — M25.562 LEFT KNEE PAIN, UNSPECIFIED CHRONICITY: ICD-10-CM

## 2017-04-19 PROCEDURE — 97110 THERAPEUTIC EXERCISES: CPT

## 2017-04-19 NOTE — PROGRESS NOTES
Physical Therapy Discharge Note    Name: Norma Thomas [facility] Number: [id number]      Diagnosis:   Encounter Diagnosis   Name Primary?    Left knee pain, unspecified chronicity      Physician: Justus Venegas, *  Treatment Orders: PT Eval and Treat    No past medical history on file.  Current Outpatient Prescriptions   Medication Sig    clonazePAM (KLONOPIN) 0.5 MG tablet Take 1 tablet (0.5 mg total) by mouth 2 (two) times daily as needed for Anxiety.    norethindrone ac-eth estradiol (LOESTRIN 1.5/30, 21,) 1.5-30 mg-mcg Tab Take 1 tablet by mouth once daily.    venlafaxine (EFFEXOR-XR) 37.5 MG 24 hr capsule Take 1 capsule (37.5 mg total) by mouth once daily.     No current facility-administered medications for this visit.      Review of patient's allergies indicates:  No Known Allergies  Precautions: N/A    Today's Date:  4/19/2017  Evaluation Date: 3/14/2017  Visit # authorized: 7/30  Authorization period: 12/31/2017  Time In: 700  Time Out: 735    Subjective     Patient states she ran the [employer] and it went well.  She could feel her knee a little at mile 5 but concentrated more on her form and it felt better.  She is comfortable with HEP and has been keeping up with it as well as bought a set of resistance bands to progress herself as able.    Patient Goals: Return to running, hopefully in August    Objective     Functional Limitations Reports   Category: Mobility  Tool: FOTO Knee 70% 3/27/17    TREATMENT:  Norma received therapeutic exercises to develop strength and endurance, flexibility for 35 minutes including:   Bike L5/10'  Treadmill Analysis 10'  Stretching  Video Discussion  HEP    Instructed pt. regarding: Proper technique with all exercises. Pt demonstrated good understanding of the education provided. Norma demonstrated good return demonstration of activities.    Assessment     Patient has progressed as expected and is doing quite well on her own.  She has  met all long-term goals and ran the [place] last weekend.  She is discharged at this time.    This is a 26 y.o. female referred to outpatient physical therapy and presents with a medical diagnosis of left knee pain.  Patient presents with signs and symptoms consistent with patellofemoral dysfunction exacerbated by lateral patellar compression caused by poor hip/glute strength and control with running.  Patient's chief complaint is inability to run.  Patient will benefit from skilled physical therapy services, specifically gross lower extremity strengthening, improved dynamic balance and proprioception, progressing to running analysis as tolerated with the resolution of pain.     Medical necessity is demonstrated by the following IMPAIRMENTS/PROBLEM LIST:   1)Increase in pain level limiting function   2)Decreased strength   3)Decreased dynamic balance/proprioception   4)Decreased ability to run   5)Lack of HEP    GOALS: Short Term Goals:  4-6 weeks  1.  Report decreased left knee pain  < / =  2/10  to increase tolerance for return to running progression.  Achieved 3/30/2017.  2.  Demonstrate single limb squat with proper biomechanics in order to safely return to daily and recreational activities.  Achieved 3/30/2017.  3.  Increase strength to >/= 4+/5 in gross bilateral LEs to increase tolerance for return to running.  Achieved 3/30/2017.  4.  Pt to tolerate HEP to improve strength and return to running and recreational fitness activities as tolerated.  Achieved 3/30/2017.    Plan     Patient is discharged.    Saira Cabrera, PT, DPT

## 2017-09-26 DIAGNOSIS — F41.9 ANXIETY: ICD-10-CM

## 2017-09-27 ENCOUNTER — PATIENT MESSAGE (OUTPATIENT)
Dept: INTERNAL MEDICINE | Facility: CLINIC | Age: 27
End: 2017-09-27

## 2017-09-27 RX ORDER — VENLAFAXINE HYDROCHLORIDE 37.5 MG/1
37.5 CAPSULE, EXTENDED RELEASE ORAL DAILY
Qty: 30 CAPSULE | Refills: 2 | Status: SHIPPED | OUTPATIENT
Start: 2017-09-27 | End: 2017-10-24 | Stop reason: SDUPTHER

## 2017-09-27 NOTE — TELEPHONE ENCOUNTER
Hi, please call patient -- to set up with new pcp in resident clinic.  I will send in one prescription of this medicine, but patient needs to be seen for any further prescriptions.  Thank you, Colt Nassar

## 2017-10-24 ENCOUNTER — OFFICE VISIT (OUTPATIENT)
Dept: INTERNAL MEDICINE | Facility: CLINIC | Age: 27
End: 2017-10-24
Payer: COMMERCIAL

## 2017-10-24 VITALS
HEIGHT: 70 IN | DIASTOLIC BLOOD PRESSURE: 74 MMHG | WEIGHT: 166.88 LBS | SYSTOLIC BLOOD PRESSURE: 121 MMHG | BODY MASS INDEX: 23.89 KG/M2 | HEART RATE: 67 BPM

## 2017-10-24 DIAGNOSIS — Z23 NEED FOR TETANUS BOOSTER: ICD-10-CM

## 2017-10-24 DIAGNOSIS — F41.9 ANXIETY: ICD-10-CM

## 2017-10-24 DIAGNOSIS — Z00.00 ANNUAL PHYSICAL EXAM: Primary | ICD-10-CM

## 2017-10-24 PROCEDURE — 99999 PR PBB SHADOW E&M-EST. PATIENT-LVL III: CPT | Mod: PBBFAC,,, | Performed by: STUDENT IN AN ORGANIZED HEALTH CARE EDUCATION/TRAINING PROGRAM

## 2017-10-24 PROCEDURE — 90714 TD VACC NO PRESV 7 YRS+ IM: CPT | Mod: S$GLB,,, | Performed by: INTERNAL MEDICINE

## 2017-10-24 PROCEDURE — 99395 PREV VISIT EST AGE 18-39: CPT | Mod: 25,S$GLB,, | Performed by: STUDENT IN AN ORGANIZED HEALTH CARE EDUCATION/TRAINING PROGRAM

## 2017-10-24 PROCEDURE — 90471 IMMUNIZATION ADMIN: CPT | Mod: S$GLB,,, | Performed by: INTERNAL MEDICINE

## 2017-10-24 RX ORDER — VENLAFAXINE HYDROCHLORIDE 37.5 MG/1
37.5 CAPSULE, EXTENDED RELEASE ORAL DAILY
Qty: 90 CAPSULE | Refills: 3 | Status: SHIPPED | OUTPATIENT
Start: 2017-10-24 | End: 2018-08-17 | Stop reason: SDUPTHER

## 2017-10-24 RX ORDER — NORETHINDRONE ACETATE AND ETHINYL ESTRADIOL .03; 1.5 MG/1; MG/1
1 TABLET ORAL DAILY
Qty: 21 EACH | Refills: 11 | Status: SHIPPED | OUTPATIENT
Start: 2017-10-24 | End: 2018-09-10 | Stop reason: SDUPTHER

## 2017-10-24 NOTE — PROGRESS NOTES
Ms. Norma Manjarrez is a 26 y.o. female who is here for an annual physical exam.      HPI:  Patient states that she has no complaints today other than needing her anxiety medication refilled . She states that she is relatively healthy and has no PMH other than anxiety. She takes Effexor for 37.5 mg daily. Dose has been stable for years. Klonopin is used for anxiety attacks and she has not needed one for a year now. Her anxiety is situational but has been stable.  She has no other complaints and is going well.      Immunizations:  Flu: Done 2017  Tdap:NA  Prevnar: NA  Pneumovax:NA  Zoster:NA    Screenings:  Eye exam: NA  Dental:  GI:NA  Mammogram:NA  Pelvic Exam/Pap: Due in 2019  Foot exam: NA  Bone Density: NA  LMP: On Birth control     Weight: 166  Diet: Balanced  Exercise: 5 times a week   Sleep: Average 7 hours     Social:  ETOH use: Socially   Sexual activity/STD concern: None   Tobacco use: None  Illicit activity use:  None    Current Outpatient Prescriptions   Medication Sig    clonazePAM (KLONOPIN) 0.5 MG tablet Take 1 tablet (0.5 mg total) by mouth 2 (two) times daily as needed for Anxiety.    norethindrone ac-eth estradiol (LOESTRIN 1.5/30, 21,) 1.5-30 mg-mcg Tab Take 1 tablet by mouth once daily.    venlafaxine (EFFEXOR-XR) 37.5 MG 24 hr capsule Take 1 capsule (37.5 mg total) by mouth once daily.     No current facility-administered medications for this visit.        Review of patient's allergies indicates:  No Known Allergies    History reviewed. No pertinent past medical history.    Past Surgical History:   Procedure Laterality Date    HAND SURGERY  2010 2007 4 total       Social History     Social History    Marital status: Single     Spouse name: N/A    Number of children: N/A    Years of education: N/A     Occupational History    Not on file.     Social History Main Topics    Smoking status: Never Smoker    Smokeless tobacco: Not on file    Alcohol use Yes    Drug use: No     "Sexual activity: Yes     Partners: Male     Other Topics Concern    Not on file     Social History Narrative    No narrative on file       Answers for HPI/ROS submitted by the patient on 10/23/2017   activity change: No  unexpected weight change: No  neck pain: No  hearing loss: No  rhinorrhea: No  trouble swallowing: No  eye discharge: No  visual disturbance: No  chest tightness: No  wheezing: No  chest pain: No  palpitations: No  blood in stool: No  constipation: No  vomiting: No  diarrhea: No  polydipsia: No  polyuria: No  difficulty urinating: No  hematuria: No  menstrual problem: No  dysuria: No  joint swelling: No  arthralgias: No  headaches: No  weakness: No  confusion: No  dysphoric mood: No  OBJECTIVE:     Vital Signs:  Vitals:    10/24/17 1322   BP: 121/74   Pulse: 67   Weight: 75.7 kg (166 lb 14.2 oz)   Height: 5' 10" (1.778 m)       Body mass index is 23.95 kg/m².     Physical Exam:  General:  Well developed, well nourished, no acute distress  Head: Normocephalic, atraumatic  Eyes: PERRL, EOMI, clear sclera  Throat: No posterior pharyngeal erythema or exudate, no tonsillar exudate  Neck: supple, normal ROM, no thyromegaly   CVS:  RRR, S1 and S2 normal, no murmurs, rubs, gallops, 2+ peripheral pulses  Resp:  Lungs clear to auscultation, no wheezes, rales, rhonchi, cough  GI:  Abdomen soft, non-tender, non-distended, normoactive bowel sounds  MSK:  No muscle atrophy, cyanosis, peripheral edema   Skin:  No rashes, ulcers, erythema  Neuro:  CNII-XII grossly intact, no focal deficits noted  Psych:  Appropriate mood and affect, normal judgement    Laboratory  No results found for: WBC, HGB, HCT, MCV, PLT  @[id number](GLU,NA,K,Cl,CO2,BUN,Creatinine,Calcium,MG)@  No results found for: INR, PROTIME  No results found for: HGBA1C  No results for input(s): POCTGLUCOSE in the last 72 hours.    Diagnostic Results:  None in file     ASSESSMENT & PLAN:   Ms. Norma Manjarrez is a 26 y.o. female here for a " physical exam and to establish care.     Annual physical exam       -      Patient with no complaints and up to date with her pap smear, and flu shot        -      Been on same OCP and Effexor since she was 17.   -     Continue norethindrone ac-eth estradiol (LOESTRIN 1.5/30, 21,)  -     CBC auto differential; Future; Expected date: 11/07/2017  -     Lipid panel; Future; Expected date: 11/07/2017    Anxiety        -     Patient with no panic attacks since 2015, she has been doing well on her effexor. She does not use clonazepam that often  -      Continue venlafaxine (EFFEXOR-XR) 37.5 MG 24 hr capsule; Take 1 capsule (37.5 mg total) by mouth once daily.  Dispense: 90 capsule; Refill: 3    Need for tetanus booster       - Last booster was 10 years ago  -(In Office Administered) Td Vaccine        RTC in one year for your annual physical     Discussed with Dr. Nassar  - staff attestation to follow        Kristyn Boggs MD, MPH  Internal Medicine PGY1  [facility]  [address] [place] [address] [id number] [phone]  Attending Physician: Dr. Nassar

## 2018-08-17 ENCOUNTER — OFFICE VISIT (OUTPATIENT)
Dept: INTERNAL MEDICINE | Facility: CLINIC | Age: 28
End: 2018-08-17
Payer: COMMERCIAL

## 2018-08-17 VITALS
BODY MASS INDEX: 23.91 KG/M2 | HEART RATE: 66 BPM | OXYGEN SATURATION: 98 % | WEIGHT: 167 LBS | HEIGHT: 70 IN | SYSTOLIC BLOOD PRESSURE: 104 MMHG | DIASTOLIC BLOOD PRESSURE: 77 MMHG

## 2018-08-17 DIAGNOSIS — F41.9 ANXIETY: ICD-10-CM

## 2018-08-17 DIAGNOSIS — F41.0 PANIC ATTACKS: ICD-10-CM

## 2018-08-17 DIAGNOSIS — Z00.00 HEALTH CARE MAINTENANCE: Primary | ICD-10-CM

## 2018-08-17 PROBLEM — M25.562 LEFT KNEE PAIN: Status: RESOLVED | Noted: 2017-03-07 | Resolved: 2018-08-17

## 2018-08-17 PROCEDURE — 99999 PR PBB SHADOW E&M-EST. PATIENT-LVL III: CPT | Mod: PBBFAC,,, | Performed by: INTERNAL MEDICINE

## 2018-08-17 PROCEDURE — 87491 CHLMYD TRACH DNA AMP PROBE: CPT

## 2018-08-17 PROCEDURE — 99395 PREV VISIT EST AGE 18-39: CPT | Mod: S$GLB,,, | Performed by: INTERNAL MEDICINE

## 2018-08-17 RX ORDER — CLONAZEPAM 0.5 MG/1
0.5 TABLET ORAL 2 TIMES DAILY PRN
Qty: 15 TABLET | Refills: 0 | Status: SHIPPED | OUTPATIENT
Start: 2018-08-17 | End: 2019-11-04 | Stop reason: SDUPTHER

## 2018-08-17 RX ORDER — VENLAFAXINE HYDROCHLORIDE 75 MG/1
75 TABLET, EXTENDED RELEASE ORAL
COMMUNITY
Start: 2014-02-18 | End: 2018-08-17

## 2018-08-17 RX ORDER — CLONAZEPAM 0.5 MG/1
0.5 TABLET ORAL
COMMUNITY
Start: 2014-02-18 | End: 2018-08-17

## 2018-08-17 RX ORDER — VENLAFAXINE HYDROCHLORIDE 75 MG/1
75 CAPSULE, EXTENDED RELEASE ORAL DAILY
Qty: 90 CAPSULE | Refills: 3 | Status: SHIPPED | OUTPATIENT
Start: 2018-08-17 | End: 2019-08-02

## 2018-08-17 NOTE — PROGRESS NOTES
"Subjective:       Patient ID: Norma Manjarrez is a 27 y.o. female.    Chief Complaint: Annual Exam (yearly check up.) and Anxiety (pt had some concerns in regards to the anxiety medications.)    HPI   Norma Manjarrez is a 27 y.o. female here to establish care and have yearly preventative healthcare visit.     Anxiety  effexor xr 37.5mg  Klonopin 0.5mg very rarely previously prn panic attack; had one last week. Had to take 1.5 pills few weeks ago and 1/2 last week.   Anxiety has been worse about a month.  Seeing therapist. Not sleeping well. Exercising.   On ocp but no recent changes.     Review of Systems   Constitutional: Negative for activity change and unexpected weight change.   HENT: Negative for hearing loss, rhinorrhea and trouble swallowing.    Eyes: Negative for discharge and visual disturbance.   Respiratory: Negative for chest tightness and wheezing.    Cardiovascular: Negative for chest pain and palpitations.   Gastrointestinal: Negative for blood in stool, constipation, diarrhea and vomiting.   Endocrine: Negative for polydipsia and polyuria.   Genitourinary: Negative for difficulty urinating, dysuria, hematuria and menstrual problem.   Musculoskeletal: Negative for arthralgias, joint swelling and neck pain.   Neurological: Negative for weakness and headaches.   Psychiatric/Behavioral: Positive for dysphoric mood. Negative for confusion.       Objective:   /77 (BP Location: Left arm, Patient Position: Sitting, BP Method: Medium (Manual))   Pulse 66   Ht 5' 10" (1.778 m)   Wt 75.8 kg (167 lb)   SpO2 98%   BMI 23.96 kg/m²      Physical Exam   Constitutional: She is oriented to person, place, and time. She appears well-developed and well-nourished.   HENT:   Head: Normocephalic and atraumatic.   Eyes: Conjunctivae and EOM are normal. Pupils are equal, round, and reactive to light.   Neck: Neck supple. No thyromegaly present.   Cardiovascular: Normal rate, regular rhythm and " normal heart sounds.   No murmur heard.  Pulmonary/Chest: Effort normal and breath sounds normal. No respiratory distress. She has no wheezes.   Abdominal: Soft. Bowel sounds are normal. She exhibits no distension. There is no tenderness.   Musculoskeletal: Normal range of motion.   Neurological: She is alert and oriented to person, place, and time.   Skin: Skin is warm and dry. No rash noted.   Psychiatric: She has a normal mood and affect. Judgment and thought content normal.   Vitals reviewed.      Assessment:       1. Health care maintenance    2. Anxiety    3. Panic attacks        Plan:       Norma was seen today for annual exam and anxiety.    Diagnoses and all orders for this visit:    Health care maintenance  -     CBC auto differential; Future  -     Comprehensive metabolic panel; Future  -     Hemoglobin A1c; Future  -     Lipid panel; Future  -     TSH; Future  -     Vitamin D; Future  -     C. trachomatis/N. gonorrhoeae by AMP DNA  -     HIV-1 and HIV-2 antibodies; Future  -     RPR; Future    Anxiety w panic attacks  -     Increase venlafaxine (EFFEXOR-XR) 75 MG 24 hr capsule; Take 1 capsule (75 mg total) by mouth once daily.  -     clonazePAM (KLONOPIN) 0.5 MG tablet; Take 1 tablet (0.5 mg total) by mouth 2 (two) times daily as needed for Anxiety. #15, uses very rarely   Check in in 4-8 weeks re: effectiveness

## 2018-08-20 LAB
C TRACH DNA SPEC QL NAA+PROBE: NOT DETECTED
N GONORRHOEA DNA SPEC QL NAA+PROBE: NOT DETECTED

## 2018-09-10 ENCOUNTER — OFFICE VISIT (OUTPATIENT)
Dept: OBSTETRICS AND GYNECOLOGY | Facility: CLINIC | Age: 28
End: 2018-09-10
Payer: COMMERCIAL

## 2018-09-10 VITALS
BODY MASS INDEX: 23.42 KG/M2 | HEIGHT: 70 IN | DIASTOLIC BLOOD PRESSURE: 72 MMHG | WEIGHT: 163.56 LBS | SYSTOLIC BLOOD PRESSURE: 126 MMHG

## 2018-09-10 DIAGNOSIS — Z12.4 PAP SMEAR FOR CERVICAL CANCER SCREENING: ICD-10-CM

## 2018-09-10 DIAGNOSIS — Z01.419 WOMEN'S ANNUAL ROUTINE GYNECOLOGICAL EXAMINATION: Primary | ICD-10-CM

## 2018-09-10 DIAGNOSIS — Z00.00 ANNUAL PHYSICAL EXAM: ICD-10-CM

## 2018-09-10 DIAGNOSIS — Z30.41 ENCOUNTER FOR SURVEILLANCE OF CONTRACEPTIVE PILLS: ICD-10-CM

## 2018-09-10 PROCEDURE — 99999 PR PBB SHADOW E&M-EST. PATIENT-LVL III: CPT | Mod: PBBFAC,,, | Performed by: NURSE PRACTITIONER

## 2018-09-10 PROCEDURE — 88175 CYTOPATH C/V AUTO FLUID REDO: CPT

## 2018-09-10 PROCEDURE — 99395 PREV VISIT EST AGE 18-39: CPT | Mod: S$GLB,,, | Performed by: NURSE PRACTITIONER

## 2018-09-10 RX ORDER — NORETHINDRONE ACETATE AND ETHINYL ESTRADIOL .03; 1.5 MG/1; MG/1
1 TABLET ORAL DAILY
Qty: 21 EACH | Refills: 11 | Status: SHIPPED | OUTPATIENT
Start: 2018-09-10 | End: 2019-07-11

## 2018-09-10 NOTE — PROGRESS NOTES
CC: Annual  HPI: Pt is a 27 y.o.  female who presents for routine annual exam. She uses OCPs for contraception. She is happy with them and desires a refill today. She does not want STD screening, just had it done recently. No problems today. Still working at [employer] main campus.    Last pap in 2016 WNL per pt report-requesting pap update today.    ROS:  GENERAL: Feeling well overall. Denies fever or chills.   SKIN: Denies rash or lesions.   HEAD: Denies head injury or headache.   NODES: Denies enlarged lymph nodes.   CHEST: Denies chest pain or shortness of breath.   CARDIOVASCULAR: Denies palpitations or left sided chest pain.   ABDOMEN: No abdominal pain, constipation, diarrhea, nausea, vomiting or rectal bleeding.   URINARY: No dysuria, hematuria, or burning on urination.  REPRODUCTIVE: See HPI.   BREASTS: Denies pain, lumps, or nipple discharge.   HEMATOLOGIC: No easy bruisability or excessive bleeding.   MUSCULOSKELETAL: Denies joint pain or swelling.   NEUROLOGIC: Denies syncope or weakness.   PSYCHIATRIC: Denies depression, anxiety or mood swings.    PE:   APPEARANCE: Well nourished, well developed, White female in no acute distress.  NODES: no cervical, supraclavicular, or inguinal lymphadenopathy  BREASTS: Symmetrical, no skin changes or visible lesions. No palpable masses, nipple discharge or adenopathy bilaterally.  ABDOMEN: Soft. No tenderness or masses. No distention. No hernias palpated. No CVA tenderness.  VULVA: No lesions. Normal external female genitalia.  URETHRAL MEATUS: Normal size and location, no lesions, no prolapse.  URETHRA: No masses, tenderness, or prolapse.  VAGINA: Moist. No lesions or lacerations noted. No abnormal discharge present. No odor present.   CERVIX: No lesions or discharge. No cervical motion tenderness. Friable  UTERUS: Normal size, regular shape, mobile, non-tender.  ADNEXA: No tenderness. No fullness or masses palpated in the adnexal regions.   ANUS PERINEUM:  Normal.      Diagnosis:  1. Women's annual routine gynecological examination    2. Encounter for surveillance of contraceptive pills    3. Pap smear for cervical cancer screening    4. Annual physical exam      Orders Placed This Encounter    Liquid-based pap smear, screening    norethindrone ac-eth estradiol (LOESTRIN 1.5/30, 21,) 1.5-30 mg-mcg Tab     Pap updated per pt request  OCPs refilled-The use of the oral contraceptive has been fully discussed with the patient. This includes the proper method to initiate and continue the pills, the need for regular compliance to ensure adequate contraceptive effect, the physiology which make the pill effective, the instructions for what to do in event of a missed pill, and warnings about anticipated minor side effects such as breakthrough spotting, nausea, breast tenderness, weight changes, acne, headaches, etc.  She has been told of the more serious potential side effects such as MI, stroke, and deep vein thrombosis, all of which are very unlikely.  She has been asked to report any signs of such serious problems immediately.  She should back up the pill with a condom during any cycle in which antibiotics are prescribed, and during the first cycle as well. The need for additional protection, such as a condom, to prevent exposure to sexually transmitted diseases has also been discussed- the patient has been clearly reminded that OCP's cannot protect her against diseases such as HIV and others. She understands and wishes to take the medication as prescribed.       Patient was counseled today on the new ACS guidelines for cervical cytology screening as well as the current recommendations for breast cancer screening. She was counseled to follow up with her PCP for other routine health maintenance. Counseling session lasted approximately 10 minutes, and all her questions were answered.    Follow-up with me in 1 year for routine exam; pap in 3 years.

## 2018-10-30 ENCOUNTER — OFFICE VISIT (OUTPATIENT)
Dept: DERMATOLOGY | Facility: CLINIC | Age: 28
End: 2018-10-30
Payer: COMMERCIAL

## 2018-10-30 DIAGNOSIS — D22.9 MULTIPLE BENIGN NEVI: Primary | ICD-10-CM

## 2018-10-30 DIAGNOSIS — Z12.83 SCREENING EXAM FOR SKIN CANCER: ICD-10-CM

## 2018-10-30 PROCEDURE — 99203 OFFICE O/P NEW LOW 30 MIN: CPT | Mod: S$GLB,,, | Performed by: DERMATOLOGY

## 2018-10-30 PROCEDURE — 99999 PR PBB SHADOW E&M-EST. PATIENT-LVL II: CPT | Mod: PBBFAC,,, | Performed by: DERMATOLOGY

## 2018-10-30 NOTE — PROGRESS NOTES
"  Subjective:       Patient ID:  Norma Manjarrez is a 27 y.o. female who presents for   Chief Complaint   Patient presents with    Skin Check     TBSE     Here for TBSE    No tanning bed use    No h/o nmsc or mm    Had mole removed right shoulder 5 years ago in [place] - recurred -- told "benign"        Review of Systems   Skin: Positive for daily sunscreen use, activity-related sunscreen use and recent sunburn (shoulders).   Hematologic/Lymphatic: Does not bruise/bleed easily.        Objective:    Physical Exam   Constitutional: She appears well-developed and well-nourished. No distress.   Neurological: She is alert and oriented to person, place, and time. She is not disoriented.   Psychiatric: She has a normal mood and affect.   Skin:   Areas Examined (abnormalities noted in diagram):   Scalp / Hair Palpated and Inspected  Head / Face Inspection Performed  Neck Inspection Performed  Chest / Axilla Inspection Performed  Abdomen Inspection Performed  Genitals / Buttocks / Groin Inspection Performed  Back Inspection Performed  RUE Inspected  LUE Inspection Performed  RLE Inspected  LLE Inspection Performed  Nails and Digits Inspection Performed                   Diagram Legend     Erythematous scaling macule/papule c/w actinic keratosis       Vascular papule c/w angioma      Pigmented verrucoid papule/plaque c/w seborrheic keratosis      Yellow umbilicated papule c/w sebaceous hyperplasia      Irregularly shaped tan macule c/w lentigo     1-2 mm smooth white papules consistent with Milia      Movable subcutaneous cyst with punctum c/w epidermal inclusion cyst      Subcutaneous movable cyst c/w pilar cyst      Firm pink to brown papule c/w dermatofibroma      Pedunculated fleshy papule(s) c/w skin tag(s)      Evenly pigmented macule c/w junctional nevus     Mildly variegated pigmented, slightly irregular-bordered macule c/w mildly atypical nevus      Flesh colored to evenly pigmented papule c/w intradermal nevus "       Pink pearly papule/plaque c/w basal cell carcinoma      Erythematous hyperkeratotic cursted plaque c/w SCC      Surgical scar with no sign of skin cancer recurrence      Open and closed comedones      Inflammatory papules and pustules      Verrucoid papule consistent consistent with wart     Erythematous eczematous patches and plaques     Dystrophic onycholytic nail with subungual debris c/w onychomycosis     Umbilicated papule    Erythematous-base heme-crusted tan verrucoid plaque consistent with inflamed seborrheic keratosis     Erythematous Silvery Scaling Plaque c/w Psoriasis     See annotation      Assessment / Plan:        Multiple benign nevi  total body skin examination performed today including at least 12 points as noted in physical examination. No lesions suspicious for malignancy noted.  Reassurance provided.  Instructed patient to observe lesion(s) for changes and follow up in clinic if changes are noted. Discussed ABCDE's of moles and brochure provided.      Screening exam for skin cancer  Total body skin examination performed today including at least 12 points as noted in physical examination. No lesions suspicious for malignancy noted.               Follow-up if symptoms worsen or fail to improve.

## 2019-07-11 DIAGNOSIS — Z00.00 ANNUAL PHYSICAL EXAM: ICD-10-CM

## 2019-07-11 RX ORDER — NORETHINDRONE ACETATE AND ETHINYL ESTRADIOL .03; 1.5 MG/1; MG/1
1 TABLET ORAL DAILY
Qty: 21 EACH | Refills: 11 | Status: SHIPPED | OUTPATIENT
Start: 2019-07-11 | End: 2020-02-20

## 2019-08-02 DIAGNOSIS — F41.9 ANXIETY: ICD-10-CM

## 2019-08-02 RX ORDER — VENLAFAXINE HYDROCHLORIDE 75 MG/1
75 CAPSULE, EXTENDED RELEASE ORAL DAILY
Qty: 90 CAPSULE | Refills: 0 | OUTPATIENT
Start: 2019-08-02 | End: 2020-08-01

## 2019-08-02 RX ORDER — VENLAFAXINE HYDROCHLORIDE 75 MG/1
75 CAPSULE, EXTENDED RELEASE ORAL DAILY
Qty: 90 CAPSULE | Refills: 0 | Status: SHIPPED | OUTPATIENT
Start: 2019-08-02 | End: 2019-11-04 | Stop reason: SDUPTHER

## 2019-11-04 ENCOUNTER — TELEPHONE (OUTPATIENT)
Dept: INTERNAL MEDICINE | Facility: CLINIC | Age: 29
End: 2019-11-04

## 2019-11-04 ENCOUNTER — OFFICE VISIT (OUTPATIENT)
Dept: INTERNAL MEDICINE | Facility: CLINIC | Age: 29
End: 2019-11-04
Attending: INTERNAL MEDICINE
Payer: COMMERCIAL

## 2019-11-04 VITALS
HEIGHT: 70 IN | WEIGHT: 163 LBS | DIASTOLIC BLOOD PRESSURE: 82 MMHG | BODY MASS INDEX: 23.34 KG/M2 | SYSTOLIC BLOOD PRESSURE: 120 MMHG

## 2019-11-04 DIAGNOSIS — F41.9 ANXIETY: ICD-10-CM

## 2019-11-04 DIAGNOSIS — Z00.00 HEALTH CARE MAINTENANCE: Primary | ICD-10-CM

## 2019-11-04 DIAGNOSIS — F41.0 PANIC ATTACKS: ICD-10-CM

## 2019-11-04 PROCEDURE — 99999 PR PBB SHADOW E&M-EST. PATIENT-LVL III: ICD-10-PCS | Mod: PBBFAC,,, | Performed by: INTERNAL MEDICINE

## 2019-11-04 PROCEDURE — 99999 PR PBB SHADOW E&M-EST. PATIENT-LVL III: CPT | Mod: PBBFAC,,, | Performed by: INTERNAL MEDICINE

## 2019-11-04 PROCEDURE — 99395 PREV VISIT EST AGE 18-39: CPT | Mod: S$GLB,,, | Performed by: INTERNAL MEDICINE

## 2019-11-04 PROCEDURE — 99395 PR PREVENTIVE VISIT,EST,18-39: ICD-10-PCS | Mod: S$GLB,,, | Performed by: INTERNAL MEDICINE

## 2019-11-04 RX ORDER — CLONAZEPAM 0.5 MG/1
0.5 TABLET ORAL 2 TIMES DAILY PRN
Qty: 15 TABLET | Refills: 0 | Status: SHIPPED | OUTPATIENT
Start: 2019-11-04 | End: 2021-03-22 | Stop reason: SDUPTHER

## 2019-11-04 RX ORDER — VENLAFAXINE HYDROCHLORIDE 75 MG/1
75 CAPSULE, EXTENDED RELEASE ORAL DAILY
Qty: 90 CAPSULE | Refills: 3 | Status: SHIPPED | OUTPATIENT
Start: 2019-11-04 | End: 2020-10-29 | Stop reason: SDUPTHER

## 2019-11-04 RX ORDER — NORETHINDRONE ACETATE AND ETHINYL ESTRADIOL 1MG-20(21)
KIT ORAL
COMMUNITY
Start: 2018-11-25 | End: 2020-12-09

## 2019-11-04 NOTE — PROGRESS NOTES
"Subjective:       Patient ID: Norma Manjarrez is a 28 y.o. female.    Chief Complaint: Annual Exam (annual physical. ) and Medication Refill (needed a few Rx's sent into pharmacy. )    HPI   Norma Manjarrez is a 28 y.o. female here for a yearly preventative healthcare visit.      Anxiety  effexor xr 75mg  Klonopin 0.5mg very rarely previously prn panic attack  #15 has lasted since August 2018.  Seeing new therapist which is going well.    Was denied to give blood because of low iron. She doesn't get periods on OCP.     Review of Systems   Constitutional: Negative for activity change and unexpected weight change.   HENT: Negative for hearing loss, rhinorrhea and trouble swallowing.    Eyes: Negative for discharge and visual disturbance.   Respiratory: Negative for chest tightness and wheezing.    Cardiovascular: Negative for chest pain and palpitations.   Gastrointestinal: Negative for blood in stool, constipation, diarrhea and vomiting.   Endocrine: Negative for polydipsia and polyuria.   Genitourinary: Negative for difficulty urinating, dysuria, hematuria and menstrual problem.   Musculoskeletal: Negative for arthralgias, joint swelling and neck pain.   Neurological: Negative for weakness and headaches.   Psychiatric/Behavioral: Negative for confusion and dysphoric mood.       Objective:   /82 (BP Location: Left arm, Patient Position: Sitting, BP Method: Medium (Manual))   Ht 5' 10" (1.778 m)   Wt 73.9 kg (163 lb)   BMI 23.39 kg/m²      Physical Exam   Constitutional: She is oriented to person, place, and time. She appears well-developed and well-nourished.   HENT:   Head: Normocephalic and atraumatic.   Eyes: Pupils are equal, round, and reactive to light. Conjunctivae and EOM are normal.   Neck: Neck supple. No thyromegaly present.   Cardiovascular: Normal rate, regular rhythm and normal heart sounds.   No murmur heard.  Pulmonary/Chest: Effort normal and breath sounds normal. No " respiratory distress. She has no wheezes.   Abdominal: Soft. Bowel sounds are normal. She exhibits no distension. There is no tenderness.   Musculoskeletal: Normal range of motion.   Neurological: She is alert and oriented to person, place, and time.   Skin: Skin is warm and dry. No rash noted.   Psychiatric: She has a normal mood and affect. Judgment and thought content normal.   Vitals reviewed.      Assessment:       1. Health care maintenance    2. Anxiety    3. Panic attacks        Plan:       Norma was seen today for annual exam and medication refill.    Diagnoses and all orders for this visit:    Health care maintenance  -     Ferritin; Future  -     Iron and TIBC; Future  Plus previously ordered CBC, CMP,  Lipid panel, etc.      Anxiety w panic attacks  -     clonazePAM (KLONOPIN) 0.5 MG tablet; Take 1 tablet (0.5 mg total) by mouth 2 (two) times daily as needed for Anxiety. #15 lasts for a year.  -     venlafaxine (EFFEXOR-XR) 75 MG 24 hr capsule; Take 1 capsule (75 mg total) by mouth once daily.

## 2019-11-04 NOTE — TELEPHONE ENCOUNTER
The labs ordered on 8/27/18 were supposed to be done at the same time. Please schedule a lab appointment for these to be done fasting.

## 2019-11-05 ENCOUNTER — PATIENT MESSAGE (OUTPATIENT)
Dept: INTERNAL MEDICINE | Facility: CLINIC | Age: 29
End: 2019-11-05

## 2019-11-05 NOTE — TELEPHONE ENCOUNTER
Spoke to patient through via portal and she is rescheduled for fasting labs on tomorrow in [place]. Verbalized understanding.

## 2019-11-06 ENCOUNTER — LAB VISIT (OUTPATIENT)
Dept: LAB | Facility: HOSPITAL | Age: 29
End: 2019-11-06
Attending: INTERNAL MEDICINE
Payer: COMMERCIAL

## 2019-11-06 ENCOUNTER — PATIENT MESSAGE (OUTPATIENT)
Dept: INTERNAL MEDICINE | Facility: CLINIC | Age: 29
End: 2019-11-06

## 2019-11-06 DIAGNOSIS — Z00.00 HEALTH CARE MAINTENANCE: ICD-10-CM

## 2019-11-06 LAB
25(OH)D3+25(OH)D2 SERPL-MCNC: 45 NG/ML (ref 30–96)
ALBUMIN SERPL BCP-MCNC: 3.8 G/DL (ref 3.5–5.2)
ALP SERPL-CCNC: 40 U/L (ref 55–135)
ALT SERPL W/O P-5'-P-CCNC: 18 U/L (ref 10–44)
ANION GAP SERPL CALC-SCNC: 6 MMOL/L (ref 8–16)
AST SERPL-CCNC: 27 U/L (ref 10–40)
BASOPHILS # BLD AUTO: 0.03 K/UL (ref 0–0.2)
BASOPHILS NFR BLD: 0.7 % (ref 0–1.9)
BILIRUB SERPL-MCNC: 0.5 MG/DL (ref 0.1–1)
BUN SERPL-MCNC: 11 MG/DL (ref 6–20)
CALCIUM SERPL-MCNC: 9.2 MG/DL (ref 8.7–10.5)
CHLORIDE SERPL-SCNC: 107 MMOL/L (ref 95–110)
CHOLEST SERPL-MCNC: 169 MG/DL (ref 120–199)
CHOLEST/HDLC SERPL: 2.4 {RATIO} (ref 2–5)
CO2 SERPL-SCNC: 28 MMOL/L (ref 23–29)
CREAT SERPL-MCNC: 0.9 MG/DL (ref 0.5–1.4)
DIFFERENTIAL METHOD: ABNORMAL
EOSINOPHIL # BLD AUTO: 0 K/UL (ref 0–0.5)
EOSINOPHIL NFR BLD: 0.7 % (ref 0–8)
ERYTHROCYTE [DISTWIDTH] IN BLOOD BY AUTOMATED COUNT: 12.6 % (ref 11.5–14.5)
EST. GFR  (AFRICAN AMERICAN): >60 ML/MIN/1.73 M^2
EST. GFR  (NON AFRICAN AMERICAN): >60 ML/MIN/1.73 M^2
ESTIMATED AVG GLUCOSE: 94 MG/DL (ref 68–131)
GLUCOSE SERPL-MCNC: 87 MG/DL (ref 70–110)
HBA1C MFR BLD HPLC: 4.9 % (ref 4–5.6)
HCT VFR BLD AUTO: 38.5 % (ref 37–48.5)
HDLC SERPL-MCNC: 71 MG/DL (ref 40–75)
HDLC SERPL: 42 % (ref 20–50)
HGB BLD-MCNC: 12.2 G/DL (ref 12–16)
HIV 1+2 AB+HIV1 P24 AG SERPL QL IA: NEGATIVE
IMM GRANULOCYTES # BLD AUTO: 0.01 K/UL (ref 0–0.04)
IMM GRANULOCYTES NFR BLD AUTO: 0.2 % (ref 0–0.5)
LDLC SERPL CALC-MCNC: 81 MG/DL (ref 63–159)
LYMPHOCYTES # BLD AUTO: 1.6 K/UL (ref 1–4.8)
LYMPHOCYTES NFR BLD: 37.8 % (ref 18–48)
MCH RBC QN AUTO: 30.7 PG (ref 27–31)
MCHC RBC AUTO-ENTMCNC: 31.7 G/DL (ref 32–36)
MCV RBC AUTO: 97 FL (ref 82–98)
MONOCYTES # BLD AUTO: 0.4 K/UL (ref 0.3–1)
MONOCYTES NFR BLD: 8.1 % (ref 4–15)
NEUTROPHILS # BLD AUTO: 2.3 K/UL (ref 1.8–7.7)
NEUTROPHILS NFR BLD: 52.5 % (ref 38–73)
NONHDLC SERPL-MCNC: 98 MG/DL
NRBC BLD-RTO: 0 /100 WBC
PLATELET # BLD AUTO: 185 K/UL (ref 150–350)
PMV BLD AUTO: 12.4 FL (ref 9.2–12.9)
POTASSIUM SERPL-SCNC: 3.7 MMOL/L (ref 3.5–5.1)
PROT SERPL-MCNC: 6.9 G/DL (ref 6–8.4)
RBC # BLD AUTO: 3.98 M/UL (ref 4–5.4)
SODIUM SERPL-SCNC: 141 MMOL/L (ref 136–145)
TRIGL SERPL-MCNC: 85 MG/DL (ref 30–150)
TSH SERPL DL<=0.005 MIU/L-ACNC: 1.26 UIU/ML (ref 0.4–4)
WBC # BLD AUTO: 4.31 K/UL (ref 3.9–12.7)

## 2019-11-06 PROCEDURE — 82306 VITAMIN D 25 HYDROXY: CPT

## 2019-11-06 PROCEDURE — 86592 SYPHILIS TEST NON-TREP QUAL: CPT

## 2019-11-06 PROCEDURE — 36415 COLL VENOUS BLD VENIPUNCTURE: CPT

## 2019-11-06 PROCEDURE — 85025 COMPLETE CBC W/AUTO DIFF WBC: CPT

## 2019-11-06 PROCEDURE — 83036 HEMOGLOBIN GLYCOSYLATED A1C: CPT

## 2019-11-06 PROCEDURE — 80053 COMPREHEN METABOLIC PANEL: CPT

## 2019-11-06 PROCEDURE — 86703 HIV-1/HIV-2 1 RESULT ANTBDY: CPT

## 2019-11-06 PROCEDURE — 80061 LIPID PANEL: CPT

## 2019-11-06 PROCEDURE — 84443 ASSAY THYROID STIM HORMONE: CPT

## 2019-11-07 LAB — RPR SER QL: NORMAL

## 2020-02-20 DIAGNOSIS — Z00.00 ANNUAL PHYSICAL EXAM: ICD-10-CM

## 2020-02-20 RX ORDER — NORETHINDRONE ACETATE AND ETHINYL ESTRADIOL .03; 1.5 MG/1; MG/1
1 TABLET ORAL DAILY
Qty: 21 EACH | Refills: 11 | Status: CANCELLED | OUTPATIENT
Start: 2020-02-20

## 2020-02-24 ENCOUNTER — PATIENT MESSAGE (OUTPATIENT)
Dept: OBSTETRICS AND GYNECOLOGY | Facility: CLINIC | Age: 30
End: 2020-02-24

## 2020-02-24 DIAGNOSIS — Z00.00 ANNUAL PHYSICAL EXAM: ICD-10-CM

## 2020-02-24 RX ORDER — NORETHINDRONE ACETATE AND ETHINYL ESTRADIOL .03; 1.5 MG/1; MG/1
1 TABLET ORAL DAILY
Qty: 21 EACH | Refills: 0 | Status: SHIPPED | OUTPATIENT
Start: 2020-02-24 | End: 2020-12-09 | Stop reason: SDUPTHER

## 2020-03-11 ENCOUNTER — OFFICE VISIT (OUTPATIENT)
Dept: OBSTETRICS AND GYNECOLOGY | Facility: CLINIC | Age: 30
End: 2020-03-11
Payer: COMMERCIAL

## 2020-03-11 VITALS
HEIGHT: 70 IN | DIASTOLIC BLOOD PRESSURE: 82 MMHG | BODY MASS INDEX: 23.35 KG/M2 | WEIGHT: 163.13 LBS | SYSTOLIC BLOOD PRESSURE: 118 MMHG

## 2020-03-11 DIAGNOSIS — Z01.419 WELL WOMAN EXAM WITH ROUTINE GYNECOLOGICAL EXAM: Primary | ICD-10-CM

## 2020-03-11 PROCEDURE — 99395 PR PREVENTIVE VISIT,EST,18-39: ICD-10-PCS | Mod: S$GLB,,, | Performed by: OBSTETRICS & GYNECOLOGY

## 2020-03-11 PROCEDURE — 99395 PREV VISIT EST AGE 18-39: CPT | Mod: S$GLB,,, | Performed by: OBSTETRICS & GYNECOLOGY

## 2020-03-11 PROCEDURE — 99999 PR PBB SHADOW E&M-EST. PATIENT-LVL III: ICD-10-PCS | Mod: PBBFAC,,, | Performed by: OBSTETRICS & GYNECOLOGY

## 2020-03-11 PROCEDURE — 99999 PR PBB SHADOW E&M-EST. PATIENT-LVL III: CPT | Mod: PBBFAC,,, | Performed by: OBSTETRICS & GYNECOLOGY

## 2020-03-11 RX ORDER — NORETHINDRONE ACETATE AND ETHINYL ESTRADIOL .03; 1.5 MG/1; MG/1
1 TABLET ORAL DAILY
Qty: 21 EACH | Refills: 11 | Status: SHIPPED | OUTPATIENT
Start: 2020-03-11 | End: 2020-12-09

## 2020-03-11 NOTE — PROGRESS NOTES
SUBJECTIVE:     Chief Complaint: Well Woman       History of Present Illness:  Annual Exam  Patient presents for annual exam.   She c/o nothing today.  LMP: none with OCP  She denies any vd, vb, dyspareunia, dysuria, depression, anxiety.  Last pap was in 2018, neg.  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/hardware removal    HAND SURGERY Left     fracture repair - 2 surgeries     OB History        0    Para   0    Term   0       0    AB   0    Living   0       SAB   0    TAB   0    Ectopic   0    Multiple   0    Live Births                   Family History   Problem Relation Age of Onset    No Known Problems Mother     Diabetes Father         2 - resolved w weight loss    No Known Problems Sister     No Known Problems Brother     No Known Problems Maternal Grandmother     Lung cancer Maternal Grandfather         smoker    Breast cancer Neg Hx     Colon cancer Neg Hx     Heart attack Neg Hx      Social History     Tobacco Use    Smoking status: Never Smoker    Smokeless tobacco: Never Used   Substance Use Topics    Alcohol use: Yes     Frequency: 2-3 times a week     Drinks per session: 3 or 4     Binge frequency: Less than monthly    Drug use: No       Current Outpatient Medications   Medication Sig    clonazePAM (KLONOPIN) 0.5 MG tablet Take 1 tablet (0.5 mg total) by mouth 2 (two) times daily as needed for Anxiety.    norethindrone ac-eth estradiol (LOESTRIN 1.5, ,) 1.5-30 mg-mcg Tab Take  1 tablet by mouth once daily.    venlafaxine (EFFEXOR-XR) 75 MG 24 hr capsule Take 1 capsule (75 mg total) by mouth once daily.    norethindrone ac-eth estradioL (LOESTRIN 1.5/30, 21,) 1.5-30 mg-mcg Tab Take 1 tablet by mouth once daily.    norethindrone-ethinyl estradiol (LOESTRIN FE 1/20, 28-DAY,) 1 mg-20 mcg (21)/75 mg (7) per tablet Loestrin Fe 1/20 (28-Day)     No current facility-administered medications for this visit.        Review of Systems:  GENERAL: No fever, chills, fatigability or weight loss.  CARDIOVASCULAR: No chest pain. No palpitations.  RESPIRATORY: No SOB, no wheezing.  BREAST: Denies pain. No lumps. No discharge.  VULVAR: No pain, no lesions and no itching.  VAGINAL: No relaxation, no itching, no discharge, no abnormal bleeding and no lesions.  ABDOMEN: No abdominal pain. Denies nausea. Denies vomiting. No diarrhea. No constipation  URINARY: No incontinence, no nocturia, no frequency and no dysuria.  NEUROLOGICAL: No headaches. No vision changes.       OBJECTIVE:     Vitals:    03/11/20 0942   BP: 118/82       Physical Exam:  Gen: NAD, well developed, well-nourished  HEENT: Normocephalic, atraumatic  Eyes: EOM nl, conjuntivae normal  Neck: ROM normal, no thyromegaly  Respiratory: Effort normal   Abd: soft, nontender, no masses palpated  Breast: Normal bilaterally, no masses, lesions or tenderness. No nipple discharge on expression, no lymphadenopathy bilaterally.  SSE:  Vulva: no lesions or rashes  Cervix: No lesions noted, nonfriable, no vaginal discharge or vaginal bleeding noted  BME:   Cervix: No CMT  Adnexa: nl bilaterally, no masses or fullness palpated  Uterus: normal, nonenlarged  Musculoskeletal: normal ROM  Neuro: alert, AAOx3  Skin: warm and dry  Psych: mood/affect nl, behavior normal, judgement normal, thought content normal        ASSESSMENT:       ICD-10-CM ICD-9-CM    1. Well woman exam with routine gynecological exam Z01.419 V72.31           Plan:      Norma was seen  today for well woman.    Diagnoses and all orders for this visit:    Well woman exam with routine gynecological exam    Other orders  -     norethindrone ac-eth estradioL (LOESTRIN 1.5/30, 21,) 1.5-30 mg-mcg Tab; Take 1 tablet by mouth once daily.        No orders of the defined types were placed in this encounter.      Follow up in one year for annual, or prn.    Julie R Jeansonne

## 2020-04-21 DIAGNOSIS — Z01.84 ANTIBODY RESPONSE EXAMINATION: ICD-10-CM

## 2020-05-21 DIAGNOSIS — Z01.84 ANTIBODY RESPONSE EXAMINATION: ICD-10-CM

## 2020-06-20 DIAGNOSIS — Z01.84 ANTIBODY RESPONSE EXAMINATION: ICD-10-CM

## 2020-07-20 DIAGNOSIS — Z01.84 ANTIBODY RESPONSE EXAMINATION: ICD-10-CM

## 2020-08-17 ENCOUNTER — LAB VISIT (OUTPATIENT)
Dept: URGENT CARE | Facility: CLINIC | Age: 30
End: 2020-08-17
Payer: COMMERCIAL

## 2020-08-17 ENCOUNTER — TELEPHONE (OUTPATIENT)
Dept: PRIMARY CARE CLINIC | Facility: OTHER | Age: 30
End: 2020-08-17

## 2020-08-17 DIAGNOSIS — R50.9 FEVER, UNSPECIFIED FEVER CAUSE: Primary | ICD-10-CM

## 2020-08-17 DIAGNOSIS — R50.9 FEVER, UNSPECIFIED FEVER CAUSE: ICD-10-CM

## 2020-08-17 LAB
CTP QC/QA: YES
SARS-COV-2 RDRP RESP QL NAA+PROBE: NEGATIVE

## 2020-08-17 PROCEDURE — U0002 COVID-19 LAB TEST NON-CDC: HCPCS | Mod: S$GLB,,, | Performed by: INTERNAL MEDICINE

## 2020-08-17 PROCEDURE — U0002: ICD-10-PCS | Mod: S$GLB,,, | Performed by: INTERNAL MEDICINE

## 2020-08-19 ENCOUNTER — OFFICE VISIT (OUTPATIENT)
Dept: INTERNAL MEDICINE | Facility: CLINIC | Age: 30
End: 2020-08-19
Payer: COMMERCIAL

## 2020-08-19 ENCOUNTER — TELEPHONE (OUTPATIENT)
Dept: INTERNAL MEDICINE | Facility: CLINIC | Age: 30
End: 2020-08-19

## 2020-08-19 ENCOUNTER — HOSPITAL ENCOUNTER (OUTPATIENT)
Dept: RADIOLOGY | Facility: HOSPITAL | Age: 30
Discharge: HOME | End: 2020-08-19
Attending: NURSE PRACTITIONER
Payer: COMMERCIAL

## 2020-08-19 VITALS
BODY MASS INDEX: 22.69 KG/M2 | HEART RATE: 98 BPM | SYSTOLIC BLOOD PRESSURE: 118 MMHG | HEIGHT: 70 IN | DIASTOLIC BLOOD PRESSURE: 72 MMHG | WEIGHT: 158.5 LBS | OXYGEN SATURATION: 95 %

## 2020-08-19 DIAGNOSIS — R19.7 DIARRHEA, UNSPECIFIED TYPE: ICD-10-CM

## 2020-08-19 DIAGNOSIS — R50.9 FEVER, UNSPECIFIED FEVER CAUSE: ICD-10-CM

## 2020-08-19 DIAGNOSIS — Z01.84 ANTIBODY RESPONSE EXAMINATION: ICD-10-CM

## 2020-08-19 DIAGNOSIS — R10.31 RIGHT LOWER QUADRANT PAIN: ICD-10-CM

## 2020-08-19 DIAGNOSIS — R11.2 NAUSEA AND VOMITING, INTRACTABILITY OF VOMITING NOT SPECIFIED, UNSPECIFIED VOMITING TYPE: ICD-10-CM

## 2020-08-19 DIAGNOSIS — R11.2 NAUSEA AND VOMITING, INTRACTABILITY OF VOMITING NOT SPECIFIED, UNSPECIFIED VOMITING TYPE: Primary | ICD-10-CM

## 2020-08-19 DIAGNOSIS — A09 DIARRHEA OF INFECTIOUS ORIGIN: Primary | ICD-10-CM

## 2020-08-19 LAB
INFLUENZA A, MOLECULAR: NEGATIVE
INFLUENZA B, MOLECULAR: NEGATIVE
SPECIMEN SOURCE: NORMAL

## 2020-08-19 PROCEDURE — 3008F PR BODY MASS INDEX (BMI) DOCUMENTED: ICD-10-PCS | Mod: CPTII,S$GLB,, | Performed by: NURSE PRACTITIONER

## 2020-08-19 PROCEDURE — 99213 OFFICE O/P EST LOW 20 MIN: CPT | Mod: S$GLB,,, | Performed by: NURSE PRACTITIONER

## 2020-08-19 PROCEDURE — 3008F BODY MASS INDEX DOCD: CPT | Mod: CPTII,S$GLB,, | Performed by: NURSE PRACTITIONER

## 2020-08-19 PROCEDURE — 74177 CT ABDOMEN PELVIS WITH CONTRAST: ICD-10-PCS | Mod: 26,,, | Performed by: RADIOLOGY

## 2020-08-19 PROCEDURE — 99999 PR PBB SHADOW E&M-EST. PATIENT-LVL III: CPT | Mod: PBBFAC,,, | Performed by: NURSE PRACTITIONER

## 2020-08-19 PROCEDURE — 74177 CT ABD & PELVIS W/CONTRAST: CPT | Mod: 26,,, | Performed by: RADIOLOGY

## 2020-08-19 PROCEDURE — 99999 PR PBB SHADOW E&M-EST. PATIENT-LVL III: ICD-10-PCS | Mod: PBBFAC,,, | Performed by: NURSE PRACTITIONER

## 2020-08-19 PROCEDURE — 25500020 PHARM REV CODE 255: Performed by: NURSE PRACTITIONER

## 2020-08-19 PROCEDURE — 99213 PR OFFICE/OUTPT VISIT, EST, LEVL III, 20-29 MIN: ICD-10-PCS | Mod: S$GLB,,, | Performed by: NURSE PRACTITIONER

## 2020-08-19 PROCEDURE — 74177 CT ABD & PELVIS W/CONTRAST: CPT | Mod: TC

## 2020-08-19 PROCEDURE — 87502 INFLUENZA DNA AMP PROBE: CPT

## 2020-08-19 RX ORDER — CIPROFLOXACIN 500 MG/1
500 TABLET ORAL EVERY 12 HOURS
Qty: 10 TABLET | Refills: 0 | Status: SHIPPED | OUTPATIENT
Start: 2020-08-19 | End: 2020-12-09

## 2020-08-19 RX ADMIN — IOHEXOL 15 ML: 350 INJECTION, SOLUTION INTRAVENOUS at 02:08

## 2020-08-19 RX ADMIN — IOHEXOL 75 ML: 350 INJECTION, SOLUTION INTRAVENOUS at 03:08

## 2020-08-19 RX ADMIN — IOHEXOL 15 ML: 350 INJECTION, SOLUTION INTRAVENOUS at 03:08

## 2020-08-19 NOTE — TELEPHONE ENCOUNTER
Spoke with pt - flu negative. CT with enlarged LN - lozano tart Cipro bid   Oral rehydration discussed.   Will f/u via phone on Friday

## 2020-08-19 NOTE — PROGRESS NOTES
INTERNAL MEDICINE URGENT CARE NOTE    CHIEF COMPLAINT     Chief Complaint   Patient presents with    Diarrhea    Fever       HPI     Norma Manjarrez is a 29 y.o. female with anxiety who presents for an urgent visit today.    Here with c/o diarrhea and fever x 3 days - COVID test 2 days ago negative. Here with generalized abd pain and diarrhea.   No blood in stool.   [place] diet- bread and crackers and Gatorade   +nausea , no vomiting   Fever - 100.2 T max   Started Sunday - 3 days ago   Taking tylenol and ibuprofen as needed for fever and pepto for diarrhea       Past Medical History:  Past Medical History:   Diagnosis Date    Anxiety        Home Medications:  Prior to Admission medications    Medication Sig Start Date End Date Taking? Authorizing Provider   clonazePAM (KLONOPIN) 0.5 MG tablet Take 1 tablet (0.5 mg total) by mouth 2 (two) times daily as needed for Anxiety. 11/4/19   Krupa Rodriguez MD   hydrocortisone (WESTCORT) 0.2 % cream apply 2 times a day until directed to stop.  Apply to left upper eyelid twice daily x 3 days 3/16/20      norethindrone ac-eth estradiol (LOESTRIN 1.5/30, 21,) 1.5-30 mg-mcg Tab Take 1 tablet by mouth once daily. 2/24/20   Julie R. Jeansonne, MD   norethindrone ac-eth estradioL (LOESTRIN 1.5/30, 21,) 1.5-30 mg-mcg Tab Take 1 tablet by mouth once daily. 3/11/20   Julie R. Jeansonne, MD   norethindrone-ethinyl estradiol (LOESTRIN FE 1/20, 28-DAY,) 1 mg-20 mcg (21)/75 mg (7) per tablet Loestrin Fe 1/20 (28-Day) 11/25/18   Historical Provider, MD   venlafaxine (EFFEXOR-XR) 75 MG 24 hr capsule Take 1 capsule (75 mg total) by mouth once daily. 11/4/19 11/3/20  Krupa Rodriguez MD       Review of Systems:  Review of Systems   Constitutional: Positive for chills and fever. Negative for fatigue and unexpected weight change.   HENT: Negative for congestion, hearing loss, rhinorrhea and sinus pressure.    Eyes: Negative for pain, redness and visual disturbance.  "  Respiratory: Negative for cough and shortness of breath.    Cardiovascular: Negative for chest pain and palpitations.   Gastrointestinal: Positive for abdominal pain, diarrhea and nausea. Negative for abdominal distention, constipation and vomiting.   Endocrine: Negative for polydipsia, polyphagia and polyuria.   Genitourinary: Negative for dysuria, frequency, urgency and vaginal discharge.   Musculoskeletal: Positive for myalgias. Negative for arthralgias and gait problem.   Skin: Negative for color change and rash.   Allergic/Immunologic: Negative for environmental allergies and immunocompromised state.   Neurological: Negative for dizziness, weakness, light-headedness and headaches.   Hematological: Negative for adenopathy. Does not bruise/bleed easily.   Psychiatric/Behavioral: Negative for confusion and sleep disturbance. The patient is not nervous/anxious.        Health Maintainence:   Immunizations:  Health Maintenance       Date Due Completion Date    Hepatitis C Screening 1989 ---    Influenza Vaccine (1) 09/01/2020 9/6/2019    Cervical Cancer Screening 09/10/2021 9/10/2018    TETANUS VACCINE 10/24/2027 10/24/2017           PHYSICAL EXAM     /72 (BP Location: Left arm, Patient Position: Sitting, BP Method: Medium (Manual))   Pulse 98   Ht 5' 10" (1.778 m)   Wt 71.9 kg (158 lb 8.2 oz)   SpO2 95%   BMI 22.74 kg/m²     Physical Exam  Vitals signs reviewed.   Constitutional:       Appearance: She is well-developed.   HENT:      Head: Normocephalic.      Right Ear: External ear normal.      Left Ear: External ear normal.      Nose: Nose normal.      Mouth/Throat:      Pharynx: No oropharyngeal exudate.   Eyes:      Pupils: Pupils are equal, round, and reactive to light.   Neck:      Musculoskeletal: Neck supple.      Thyroid: No thyromegaly.      Vascular: No JVD.      Trachea: No tracheal deviation.   Cardiovascular:      Rate and Rhythm: Normal rate and regular rhythm.      Heart sounds: " Normal heart sounds. No murmur. No friction rub. No gallop.    Pulmonary:      Effort: Pulmonary effort is normal. No respiratory distress.      Breath sounds: Normal breath sounds. No wheezing or rales.   Abdominal:      General: Bowel sounds are normal. There is no distension.      Palpations: Abdomen is soft.      Tenderness: There is abdominal tenderness in the right lower quadrant. There is rebound. There is no right CVA tenderness or left CVA tenderness.       Musculoskeletal: Normal range of motion.         General: No tenderness.   Lymphadenopathy:      Cervical: No cervical adenopathy.   Skin:     General: Skin is warm and dry.      Findings: No rash.   Neurological:      Mental Status: She is alert and oriented to person, place, and time.   Psychiatric:         Behavior: Behavior normal.         LABS     Lab Results   Component Value Date    HGBA1C 4.9 11/06/2019     CMP  Sodium   Date Value Ref Range Status   11/06/2019 141 136 - 145 mmol/L Final     Potassium   Date Value Ref Range Status   11/06/2019 3.7 3.5 - 5.1 mmol/L Final     Chloride   Date Value Ref Range Status   11/06/2019 107 95 - 110 mmol/L Final     CO2   Date Value Ref Range Status   11/06/2019 28 23 - 29 mmol/L Final     Glucose   Date Value Ref Range Status   11/06/2019 87 70 - 110 mg/dL Final     BUN, Bld   Date Value Ref Range Status   11/06/2019 11 6 - 20 mg/dL Final     Creatinine   Date Value Ref Range Status   11/06/2019 0.9 0.5 - 1.4 mg/dL Final     Calcium   Date Value Ref Range Status   11/06/2019 9.2 8.7 - 10.5 mg/dL Final     Total Protein   Date Value Ref Range Status   11/06/2019 6.9 6.0 - 8.4 g/dL Final     Albumin   Date Value Ref Range Status   11/06/2019 3.8 3.5 - 5.2 g/dL Final     Total Bilirubin   Date Value Ref Range Status   11/06/2019 0.5 0.1 - 1.0 mg/dL Final     Comment:     For infants and newborns, interpretation of results should be based  on gestational age, weight and in agreement with  clinical  observations.  Premature Infant recommended reference ranges:  Up to 24 hours.............<8.0 mg/dL  Up to 48 hours............<12.0 mg/dL  3-5 days..................<15.0 mg/dL  6-29 days.................<15.0 mg/dL       Alkaline Phosphatase   Date Value Ref Range Status   11/06/2019 40 (L) 55 - 135 U/L Final     AST   Date Value Ref Range Status   11/06/2019 27 10 - 40 U/L Final     ALT   Date Value Ref Range Status   11/06/2019 18 10 - 44 U/L Final     Anion Gap   Date Value Ref Range Status   11/06/2019 6 (L) 8 - 16 mmol/L Final     eGFR if    Date Value Ref Range Status   11/06/2019 >60.0 >60 mL/min/1.73 m^2 Final     eGFR if non    Date Value Ref Range Status   11/06/2019 >60.0 >60 mL/min/1.73 m^2 Final     Comment:     Calculation used to obtain the estimated glomerular filtration  rate (eGFR) is the CKD-EPI equation.        Lab Results   Component Value Date    WBC 4.31 11/06/2019    HGB 12.2 11/06/2019    HCT 38.5 11/06/2019    MCV 97 11/06/2019     11/06/2019     Lab Results   Component Value Date    CHOL 169 11/06/2019     Lab Results   Component Value Date    HDL 71 11/06/2019     Lab Results   Component Value Date    LDLCALC 81.0 11/06/2019     Lab Results   Component Value Date    TRIG 85 11/06/2019     Lab Results   Component Value Date    CHOLHDL 42.0 11/06/2019     Lab Results   Component Value Date    TSH 1.262 11/06/2019       ASSESSMENT/PLAN     Norma Manjarrez is a 29 y.o. female     Nausea and vomiting, intractability of vomiting not specified, unspecified vomiting type-send flu swab and r/o appendicitis   -     CT Abdomen Pelvis With Contrast; Future; Expected date: 08/19/2020  -     Influenza A & B by Molecular    Right lower quadrant pain- send for CT scan to r/o appendicitis. Will cont bland diet and hydration   -     CT Abdomen Pelvis With Contrast; Future; Expected date: 08/19/2020    Diarrhea, unspecified type- bland diet. Cont  immodium as needed. Send for flu swab and CT scan   -     CT Abdomen Pelvis With Contrast; Future; Expected date: 08/19/2020  -     Influenza A & B by Molecular    Fever, unspecified fever cause  -     Influenza A & B by Molecular      Follow up with PCP    Patient education provided from [facility]. Patient was counseled on when and how to seek emergent care.       Tavia ALVA, SHAUN, FNP-c   Department of Internal Medicine - [facility]  1:00 PM

## 2020-09-18 DIAGNOSIS — Z01.84 ANTIBODY RESPONSE EXAMINATION: ICD-10-CM

## 2020-10-18 DIAGNOSIS — Z01.84 ANTIBODY RESPONSE EXAMINATION: ICD-10-CM

## 2020-10-29 DIAGNOSIS — F41.9 ANXIETY: ICD-10-CM

## 2020-10-29 RX ORDER — VENLAFAXINE HYDROCHLORIDE 75 MG/1
75 CAPSULE, EXTENDED RELEASE ORAL DAILY
Qty: 90 CAPSULE | Refills: 3 | Status: SHIPPED | OUTPATIENT
Start: 2020-10-29 | End: 2021-03-22

## 2020-10-29 NOTE — TELEPHONE ENCOUNTER
Care Due:                  Date            Visit Type   Department     Provider  --------------------------------------------------------------------------------                                MYCHART                              ANNUAL                              CHECKUP/PHY  [facility] INTERNAL  Last Visit: 11-      [facility]       KARLEE NG  Next Visit: None Scheduled  None         None Found                                                            Last  Test          Frequency    Reason                     Performed    Due Date  --------------------------------------------------------------------------------    Office Visit  12 months..  venlafaxine..............  11-   10-    Cr..........  12 months..  venlafaxine..............  11-   10-    Powered by [employer]. Reference number: [id number]. 10/29/2020 10:54:30 AM   CDT

## 2020-11-17 DIAGNOSIS — Z01.84 ANTIBODY RESPONSE EXAMINATION: ICD-10-CM

## 2020-12-09 ENCOUNTER — OFFICE VISIT (OUTPATIENT)
Dept: OBSTETRICS AND GYNECOLOGY | Facility: CLINIC | Age: 30
End: 2020-12-09
Payer: COMMERCIAL

## 2020-12-09 VITALS
DIASTOLIC BLOOD PRESSURE: 70 MMHG | HEIGHT: 70 IN | BODY MASS INDEX: 22.82 KG/M2 | SYSTOLIC BLOOD PRESSURE: 132 MMHG | WEIGHT: 159.38 LBS

## 2020-12-09 DIAGNOSIS — Z30.41 ENCOUNTER FOR SURVEILLANCE OF CONTRACEPTIVE PILLS: Primary | ICD-10-CM

## 2020-12-09 PROCEDURE — 99999 PR PBB SHADOW E&M-EST. PATIENT-LVL III: CPT | Mod: PBBFAC,,, | Performed by: NURSE PRACTITIONER

## 2020-12-09 PROCEDURE — 1126F PR PAIN SEVERITY QUANTIFIED, NO PAIN PRESENT: ICD-10-PCS | Mod: S$GLB,,, | Performed by: NURSE PRACTITIONER

## 2020-12-09 PROCEDURE — 1126F AMNT PAIN NOTED NONE PRSNT: CPT | Mod: S$GLB,,, | Performed by: NURSE PRACTITIONER

## 2020-12-09 PROCEDURE — 99211 OFF/OP EST MAY X REQ PHY/QHP: CPT | Mod: S$GLB,,, | Performed by: NURSE PRACTITIONER

## 2020-12-09 PROCEDURE — 3008F BODY MASS INDEX DOCD: CPT | Mod: CPTII,S$GLB,, | Performed by: NURSE PRACTITIONER

## 2020-12-09 PROCEDURE — 99211 PR OFFICE/OUTPT VISIT, EST, LEVL I: ICD-10-PCS | Mod: S$GLB,,, | Performed by: NURSE PRACTITIONER

## 2020-12-09 PROCEDURE — 3008F PR BODY MASS INDEX (BMI) DOCUMENTED: ICD-10-PCS | Mod: CPTII,S$GLB,, | Performed by: NURSE PRACTITIONER

## 2020-12-09 PROCEDURE — 99999 PR PBB SHADOW E&M-EST. PATIENT-LVL III: ICD-10-PCS | Mod: PBBFAC,,, | Performed by: NURSE PRACTITIONER

## 2020-12-09 RX ORDER — NORETHINDRONE ACETATE AND ETHINYL ESTRADIOL .03; 1.5 MG/1; MG/1
1 TABLET ORAL DAILY
Qty: 84 EACH | Refills: 1 | Status: SHIPPED | OUTPATIENT
Start: 2020-12-09 | End: 2021-03-30 | Stop reason: SDUPTHER

## 2020-12-09 NOTE — PROGRESS NOTES
"Norma Manjarrez is a 30 y.o. female  presents for a birth control refill. Saw Dr. Jeansonne in March. Happy with OCPs. No problems or concerns today-thought she had to come in for refills.     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/hardware removal    HAND SURGERY Left     fracture repair - 2 surgeries     Social History     Tobacco Use    Smoking status: Never Smoker    Smokeless tobacco: Never Used   Substance Use Topics    Alcohol use: Yes     Frequency: 2-3 times a week     Drinks per session: 3 or 4     Binge frequency: Less than monthly    Drug use: No     Family History   Problem Relation Age of Onset    No Known Problems Mother     Diabetes Father         2 - resolved w weight loss    No Known Problems Sister     No Known Problems Brother     No Known Problems Maternal Grandmother     Lung cancer Maternal Grandfather         smoker    Breast cancer Neg Hx     Colon cancer Neg Hx     Heart attack Neg Hx      OB History    Para Term  AB Living   0 0 0 0 0 0   SAB TAB Ectopic Multiple Live Births   0 0 0 0         /70   Ht 5' 10" (1.778 m)   Wt 72.3 kg (159 lb 6.3 oz)   BMI 22.87 kg/m²     ROS:  GENERAL: No fever, chills, fatigability or weight loss.  VULVAR: No pain, no lesions and no itching.  VAGINAL: No relaxation, no itching, no discharge, no abnormal bleeding and no lesions.  ABDOMEN: No abdominal pain. Denies nausea. Denies vomiting. No diarrhea. No constipation  BREAST: Denies pain. No lumps. No discharge.  URINARY: No incontinence, no nocturia, no frequency and no dysuria.  CARDIOVASCULAR: No chest pain. No shortness of breath. No leg cramps.  NEUROLOGICAL: No headaches. No vision changes.    PHYSICAL EXAM:  NO PELVIC EXAM    ASSESSMENT and PLAN:    ICD-10-CM ICD-9-CM    1. Encounter for " surveillance of contraceptive pills  Z30.41 V25.41        Current with annual exam, due in march 2021  OCPs refilled until then  S/p flu shot    5 minutes spent with patient

## 2020-12-28 ENCOUNTER — PATIENT MESSAGE (OUTPATIENT)
Dept: OBSTETRICS AND GYNECOLOGY | Facility: CLINIC | Age: 30
End: 2020-12-28

## 2020-12-28 ENCOUNTER — IMMUNIZATION (OUTPATIENT)
Dept: INTERNAL MEDICINE | Facility: CLINIC | Age: 30
End: 2020-12-28
Payer: COMMERCIAL

## 2020-12-28 DIAGNOSIS — Z23 NEED FOR VACCINATION: ICD-10-CM

## 2020-12-28 PROCEDURE — 91300 COVID-19, MRNA, LNP-S, PF, 30 MCG/0.3 ML DOSE VACCINE: ICD-10-PCS | Mod: ,,, | Performed by: INTERNAL MEDICINE

## 2020-12-28 PROCEDURE — 0001A COVID-19, MRNA, LNP-S, PF, 30 MCG/0.3 ML DOSE VACCINE: ICD-10-PCS | Mod: CV19,,, | Performed by: INTERNAL MEDICINE

## 2020-12-28 PROCEDURE — 0001A COVID-19, MRNA, LNP-S, PF, 30 MCG/0.3 ML DOSE VACCINE: CPT | Mod: CV19,,, | Performed by: INTERNAL MEDICINE

## 2020-12-28 PROCEDURE — 91300 COVID-19, MRNA, LNP-S, PF, 30 MCG/0.3 ML DOSE VACCINE: CPT | Mod: ,,, | Performed by: INTERNAL MEDICINE

## 2021-01-18 ENCOUNTER — IMMUNIZATION (OUTPATIENT)
Dept: INTERNAL MEDICINE | Facility: CLINIC | Age: 31
End: 2021-01-18
Payer: COMMERCIAL

## 2021-01-18 DIAGNOSIS — Z23 NEED FOR VACCINATION: Primary | ICD-10-CM

## 2021-01-18 PROCEDURE — 0002A COVID-19, MRNA, LNP-S, PF, 30 MCG/0.3 ML DOSE VACCINE: CPT | Mod: PBBFAC | Performed by: INTERNAL MEDICINE

## 2021-01-18 PROCEDURE — 91300 COVID-19, MRNA, LNP-S, PF, 30 MCG/0.3 ML DOSE VACCINE: CPT | Mod: PBBFAC | Performed by: INTERNAL MEDICINE

## 2021-03-05 ENCOUNTER — PATIENT MESSAGE (OUTPATIENT)
Dept: PSYCHIATRY | Facility: CLINIC | Age: 31
End: 2021-03-05

## 2021-03-22 ENCOUNTER — PATIENT MESSAGE (OUTPATIENT)
Dept: INTERNAL MEDICINE | Facility: CLINIC | Age: 31
End: 2021-03-22

## 2021-03-22 DIAGNOSIS — F41.9 ANXIETY: ICD-10-CM

## 2021-03-22 DIAGNOSIS — F41.9 ANXIETY: Primary | ICD-10-CM

## 2021-03-22 DIAGNOSIS — F41.0 PANIC ATTACKS: ICD-10-CM

## 2021-03-22 RX ORDER — VENLAFAXINE HYDROCHLORIDE 150 MG/1
150 CAPSULE, EXTENDED RELEASE ORAL DAILY
Qty: 90 CAPSULE | Refills: 3 | Status: SHIPPED | OUTPATIENT
Start: 2021-03-22 | End: 2022-03-24 | Stop reason: SDUPTHER

## 2021-03-23 RX ORDER — CLONAZEPAM 0.5 MG/1
0.5 TABLET ORAL 2 TIMES DAILY PRN
Qty: 15 TABLET | Refills: 0 | Status: SHIPPED | OUTPATIENT
Start: 2021-03-23 | End: 2024-01-12

## 2021-03-30 ENCOUNTER — OFFICE VISIT (OUTPATIENT)
Dept: OBSTETRICS AND GYNECOLOGY | Facility: CLINIC | Age: 31
End: 2021-03-30
Payer: COMMERCIAL

## 2021-03-30 VITALS
HEIGHT: 70 IN | BODY MASS INDEX: 21.05 KG/M2 | WEIGHT: 147.06 LBS | SYSTOLIC BLOOD PRESSURE: 122 MMHG | DIASTOLIC BLOOD PRESSURE: 78 MMHG

## 2021-03-30 DIAGNOSIS — Z12.4 PAP SMEAR FOR CERVICAL CANCER SCREENING: ICD-10-CM

## 2021-03-30 DIAGNOSIS — Z01.419 WELL WOMAN EXAM WITH ROUTINE GYNECOLOGICAL EXAM: Primary | ICD-10-CM

## 2021-03-30 DIAGNOSIS — Z30.41 ENCOUNTER FOR SURVEILLANCE OF CONTRACEPTIVE PILLS: ICD-10-CM

## 2021-03-30 PROCEDURE — 1126F AMNT PAIN NOTED NONE PRSNT: CPT | Mod: S$GLB,,, | Performed by: NURSE PRACTITIONER

## 2021-03-30 PROCEDURE — 3008F PR BODY MASS INDEX (BMI) DOCUMENTED: ICD-10-PCS | Mod: CPTII,S$GLB,, | Performed by: NURSE PRACTITIONER

## 2021-03-30 PROCEDURE — 99395 PREV VISIT EST AGE 18-39: CPT | Mod: S$GLB,,, | Performed by: NURSE PRACTITIONER

## 2021-03-30 PROCEDURE — 99999 PR PBB SHADOW E&M-EST. PATIENT-LVL III: ICD-10-PCS | Mod: PBBFAC,,, | Performed by: NURSE PRACTITIONER

## 2021-03-30 PROCEDURE — 99999 PR PBB SHADOW E&M-EST. PATIENT-LVL III: CPT | Mod: PBBFAC,,, | Performed by: NURSE PRACTITIONER

## 2021-03-30 PROCEDURE — 88175 CYTOPATH C/V AUTO FLUID REDO: CPT | Performed by: NURSE PRACTITIONER

## 2021-03-30 PROCEDURE — 3008F BODY MASS INDEX DOCD: CPT | Mod: CPTII,S$GLB,, | Performed by: NURSE PRACTITIONER

## 2021-03-30 PROCEDURE — 99395 PR PREVENTIVE VISIT,EST,18-39: ICD-10-PCS | Mod: S$GLB,,, | Performed by: NURSE PRACTITIONER

## 2021-03-30 PROCEDURE — 87624 HPV HI-RISK TYP POOLED RSLT: CPT | Performed by: NURSE PRACTITIONER

## 2021-03-30 PROCEDURE — 1126F PR PAIN SEVERITY QUANTIFIED, NO PAIN PRESENT: ICD-10-PCS | Mod: S$GLB,,, | Performed by: NURSE PRACTITIONER

## 2021-03-30 RX ORDER — NORETHINDRONE ACETATE AND ETHINYL ESTRADIOL .03; 1.5 MG/1; MG/1
1 TABLET ORAL DAILY
Qty: 84 EACH | Refills: 3 | Status: SHIPPED | OUTPATIENT
Start: 2021-03-30 | End: 2022-03-29 | Stop reason: SDUPTHER

## 2021-04-08 ENCOUNTER — TELEPHONE (OUTPATIENT)
Dept: OBSTETRICS AND GYNECOLOGY | Facility: CLINIC | Age: 31
End: 2021-04-08

## 2021-04-14 ENCOUNTER — OFFICE VISIT (OUTPATIENT)
Dept: PSYCHIATRY | Facility: CLINIC | Age: 31
End: 2021-04-14
Payer: COMMERCIAL

## 2021-04-14 ENCOUNTER — TELEPHONE (OUTPATIENT)
Dept: PSYCHIATRY | Facility: CLINIC | Age: 31
End: 2021-04-14

## 2021-04-14 DIAGNOSIS — F43.23 ADJUSTMENT DISORDER WITH MIXED ANXIETY AND DEPRESSED MOOD: ICD-10-CM

## 2021-04-14 PROCEDURE — 99205 PR OFFICE/OUTPT VISIT, NEW, LEVL V, 60-74 MIN: ICD-10-PCS | Mod: 95,,, | Performed by: NURSE PRACTITIONER

## 2021-04-14 PROCEDURE — 99205 OFFICE O/P NEW HI 60 MIN: CPT | Mod: 95,,, | Performed by: NURSE PRACTITIONER

## 2021-04-15 ENCOUNTER — OFFICE VISIT (OUTPATIENT)
Dept: OBSTETRICS AND GYNECOLOGY | Facility: CLINIC | Age: 31
End: 2021-04-15
Payer: COMMERCIAL

## 2021-04-15 VITALS
WEIGHT: 151 LBS | SYSTOLIC BLOOD PRESSURE: 110 MMHG | BODY MASS INDEX: 21.62 KG/M2 | DIASTOLIC BLOOD PRESSURE: 80 MMHG | HEIGHT: 70 IN

## 2021-04-15 DIAGNOSIS — Z11.51 SCREENING FOR HUMAN PAPILLOMAVIRUS (HPV): ICD-10-CM

## 2021-04-15 DIAGNOSIS — Z12.4 ENCOUNTER FOR PAPANICOLAOU SMEAR FOR CERVICAL CANCER SCREENING: Primary | ICD-10-CM

## 2021-04-15 PROCEDURE — 87624 HPV HI-RISK TYP POOLED RSLT: CPT | Performed by: PHYSICIAN ASSISTANT

## 2021-04-15 PROCEDURE — 99499 UNLISTED E&M SERVICE: CPT | Mod: S$GLB,,, | Performed by: PHYSICIAN ASSISTANT

## 2021-04-15 PROCEDURE — 1126F PR PAIN SEVERITY QUANTIFIED, NO PAIN PRESENT: ICD-10-PCS | Mod: S$GLB,,, | Performed by: PHYSICIAN ASSISTANT

## 2021-04-15 PROCEDURE — 3008F BODY MASS INDEX DOCD: CPT | Mod: CPTII,S$GLB,, | Performed by: PHYSICIAN ASSISTANT

## 2021-04-15 PROCEDURE — 99999 PR PBB SHADOW E&M-EST. PATIENT-LVL III: CPT | Mod: PBBFAC,,, | Performed by: PHYSICIAN ASSISTANT

## 2021-04-15 PROCEDURE — 99499 NO LOS: ICD-10-PCS | Mod: S$GLB,,, | Performed by: PHYSICIAN ASSISTANT

## 2021-04-15 PROCEDURE — 88175 CYTOPATH C/V AUTO FLUID REDO: CPT | Performed by: PHYSICIAN ASSISTANT

## 2021-04-15 PROCEDURE — 3008F PR BODY MASS INDEX (BMI) DOCUMENTED: ICD-10-PCS | Mod: CPTII,S$GLB,, | Performed by: PHYSICIAN ASSISTANT

## 2021-04-15 PROCEDURE — 1126F AMNT PAIN NOTED NONE PRSNT: CPT | Mod: S$GLB,,, | Performed by: PHYSICIAN ASSISTANT

## 2021-04-15 PROCEDURE — 99999 PR PBB SHADOW E&M-EST. PATIENT-LVL III: ICD-10-PCS | Mod: PBBFAC,,, | Performed by: PHYSICIAN ASSISTANT

## 2021-04-19 ENCOUNTER — TELEPHONE (OUTPATIENT)
Dept: DERMATOLOGY | Facility: CLINIC | Age: 31
End: 2021-04-19

## 2021-04-19 ENCOUNTER — OFFICE VISIT (OUTPATIENT)
Dept: DERMATOLOGY | Facility: CLINIC | Age: 31
End: 2021-04-19
Payer: COMMERCIAL

## 2021-04-19 DIAGNOSIS — L70.5 ACNE EXCORIEE: ICD-10-CM

## 2021-04-19 DIAGNOSIS — L70.0 ACNE VULGARIS: Primary | ICD-10-CM

## 2021-04-19 DIAGNOSIS — L72.9 FOLLICULAR CYST OF SKIN: ICD-10-CM

## 2021-04-19 PROCEDURE — 99214 OFFICE O/P EST MOD 30 MIN: CPT | Mod: 25,S$GLB,, | Performed by: DERMATOLOGY

## 2021-04-19 PROCEDURE — 99999 PR PBB SHADOW E&M-EST. PATIENT-LVL II: ICD-10-PCS | Mod: PBBFAC,,, | Performed by: DERMATOLOGY

## 2021-04-19 PROCEDURE — 99999 PR PBB SHADOW E&M-EST. PATIENT-LVL II: CPT | Mod: PBBFAC,,, | Performed by: DERMATOLOGY

## 2021-04-19 PROCEDURE — 1126F AMNT PAIN NOTED NONE PRSNT: CPT | Mod: S$GLB,,, | Performed by: DERMATOLOGY

## 2021-04-19 PROCEDURE — 1126F PR PAIN SEVERITY QUANTIFIED, NO PAIN PRESENT: ICD-10-PCS | Mod: S$GLB,,, | Performed by: DERMATOLOGY

## 2021-04-19 PROCEDURE — 11900 INJECT SKIN LESIONS </W 7: CPT | Mod: S$GLB,,, | Performed by: DERMATOLOGY

## 2021-04-19 PROCEDURE — 99214 PR OFFICE/OUTPT VISIT, EST, LEVL IV, 30-39 MIN: ICD-10-PCS | Mod: 25,S$GLB,, | Performed by: DERMATOLOGY

## 2021-04-19 PROCEDURE — 11900 PR INJECTION INTO SKIN LESIONS, UP TO 7: ICD-10-PCS | Mod: S$GLB,,, | Performed by: DERMATOLOGY

## 2021-04-19 RX ORDER — DOXYCYCLINE 100 MG/1
100 CAPSULE ORAL 2 TIMES DAILY
Qty: 60 CAPSULE | Refills: 1 | Status: SHIPPED | OUTPATIENT
Start: 2021-04-19 | End: 2021-08-24

## 2021-04-19 RX ORDER — TRETINOIN 0.25 MG/G
CREAM TOPICAL NIGHTLY
Qty: 45 G | Refills: 5 | Status: SHIPPED | OUTPATIENT
Start: 2021-04-19 | End: 2022-09-20 | Stop reason: SDUPTHER

## 2021-04-19 RX ORDER — MUPIROCIN 20 MG/G
OINTMENT TOPICAL 2 TIMES DAILY
Qty: 1 TUBE | Refills: 1 | Status: SHIPPED | OUTPATIENT
Start: 2021-04-19 | End: 2021-08-24

## 2021-04-20 LAB
FINAL PATHOLOGIC DIAGNOSIS: NORMAL
Lab: NORMAL

## 2021-04-21 LAB
HPV HR 12 DNA SPEC QL NAA+PROBE: NEGATIVE
HPV16 AG SPEC QL: NEGATIVE
HPV18 DNA SPEC QL NAA+PROBE: NEGATIVE

## 2021-04-23 ENCOUNTER — TELEPHONE (OUTPATIENT)
Dept: PHARMACY | Facility: CLINIC | Age: 31
End: 2021-04-23

## 2021-05-03 ENCOUNTER — OFFICE VISIT (OUTPATIENT)
Dept: DERMATOLOGY | Facility: CLINIC | Age: 31
End: 2021-05-03
Payer: COMMERCIAL

## 2021-05-03 DIAGNOSIS — L72.9 FOLLICULAR CYST OF SKIN: ICD-10-CM

## 2021-05-03 DIAGNOSIS — L70.0 ACNE VULGARIS: Primary | ICD-10-CM

## 2021-05-03 PROCEDURE — 99999 PR PBB SHADOW E&M-EST. PATIENT-LVL II: ICD-10-PCS | Mod: PBBFAC,,, | Performed by: DERMATOLOGY

## 2021-05-03 PROCEDURE — 99213 OFFICE O/P EST LOW 20 MIN: CPT | Mod: S$GLB,,, | Performed by: DERMATOLOGY

## 2021-05-03 PROCEDURE — 1126F AMNT PAIN NOTED NONE PRSNT: CPT | Mod: S$GLB,,, | Performed by: DERMATOLOGY

## 2021-05-03 PROCEDURE — 1126F PR PAIN SEVERITY QUANTIFIED, NO PAIN PRESENT: ICD-10-PCS | Mod: S$GLB,,, | Performed by: DERMATOLOGY

## 2021-05-03 PROCEDURE — 99999 PR PBB SHADOW E&M-EST. PATIENT-LVL II: CPT | Mod: PBBFAC,,, | Performed by: DERMATOLOGY

## 2021-05-03 PROCEDURE — 99213 PR OFFICE/OUTPT VISIT, EST, LEVL III, 20-29 MIN: ICD-10-PCS | Mod: S$GLB,,, | Performed by: DERMATOLOGY

## 2021-06-05 ENCOUNTER — PATIENT MESSAGE (OUTPATIENT)
Dept: DERMATOLOGY | Facility: CLINIC | Age: 31
End: 2021-06-05

## 2021-06-08 ENCOUNTER — TELEPHONE (OUTPATIENT)
Dept: DERMATOLOGY | Facility: CLINIC | Age: 31
End: 2021-06-08

## 2021-06-28 ENCOUNTER — PROCEDURE VISIT (OUTPATIENT)
Dept: DERMATOLOGY | Facility: CLINIC | Age: 31
End: 2021-06-28
Payer: COMMERCIAL

## 2021-06-28 DIAGNOSIS — L72.9 FOLLICULAR CYST OF SKIN: Primary | ICD-10-CM

## 2021-06-28 PROCEDURE — 88305 TISSUE EXAM BY PATHOLOGIST: CPT | Performed by: PATHOLOGY

## 2021-06-28 PROCEDURE — 11440 PR EXC SKIN BENIG <0.5 CM FACE,FACIAL: ICD-10-PCS | Mod: S$GLB,,, | Performed by: DERMATOLOGY

## 2021-06-28 PROCEDURE — 88305 TISSUE EXAM BY PATHOLOGIST: ICD-10-PCS | Mod: 26,,, | Performed by: PATHOLOGY

## 2021-06-28 PROCEDURE — 11440 EXC FACE-MM B9+MARG 0.5 CM/<: CPT | Mod: S$GLB,,, | Performed by: DERMATOLOGY

## 2021-06-28 PROCEDURE — 88305 TISSUE EXAM BY PATHOLOGIST: CPT | Mod: 26,,, | Performed by: PATHOLOGY

## 2021-06-28 PROCEDURE — 99499 UNLISTED E&M SERVICE: CPT | Mod: S$GLB,,, | Performed by: DERMATOLOGY

## 2021-06-28 PROCEDURE — 99499 NO LOS: ICD-10-PCS | Mod: S$GLB,,, | Performed by: DERMATOLOGY

## 2021-07-06 LAB
FINAL PATHOLOGIC DIAGNOSIS: NORMAL
GROSS: NORMAL
Lab: NORMAL
MICROSCOPIC EXAM: NORMAL

## 2021-07-07 ENCOUNTER — CLINICAL SUPPORT (OUTPATIENT)
Dept: DERMATOLOGY | Facility: CLINIC | Age: 31
End: 2021-07-07
Payer: COMMERCIAL

## 2021-07-07 DIAGNOSIS — Z48.02 ENCOUNTER FOR REMOVAL OF SUTURES: Primary | ICD-10-CM

## 2021-07-07 PROCEDURE — 99024 POSTOP FOLLOW-UP VISIT: CPT | Mod: S$GLB,,, | Performed by: DERMATOLOGY

## 2021-07-07 PROCEDURE — 99024 PR POST-OP FOLLOW-UP VISIT: ICD-10-PCS | Mod: S$GLB,,, | Performed by: DERMATOLOGY

## 2021-08-24 ENCOUNTER — OFFICE VISIT (OUTPATIENT)
Dept: URGENT CARE | Facility: CLINIC | Age: 31
End: 2021-08-24
Payer: COMMERCIAL

## 2021-08-24 ENCOUNTER — LAB VISIT (OUTPATIENT)
Dept: LAB | Facility: HOSPITAL | Age: 31
End: 2021-08-24
Attending: INTERNAL MEDICINE
Payer: COMMERCIAL

## 2021-08-24 ENCOUNTER — OFFICE VISIT (OUTPATIENT)
Dept: INTERNAL MEDICINE | Facility: CLINIC | Age: 31
End: 2021-08-24
Payer: COMMERCIAL

## 2021-08-24 VITALS
BODY MASS INDEX: 23.1 KG/M2 | SYSTOLIC BLOOD PRESSURE: 122 MMHG | HEART RATE: 70 BPM | DIASTOLIC BLOOD PRESSURE: 76 MMHG | HEIGHT: 70 IN | WEIGHT: 161.38 LBS | OXYGEN SATURATION: 98 %

## 2021-08-24 DIAGNOSIS — F41.0 GENERALIZED ANXIETY DISORDER WITH PANIC ATTACKS: ICD-10-CM

## 2021-08-24 DIAGNOSIS — Z00.00 HEALTH CARE MAINTENANCE: Primary | ICD-10-CM

## 2021-08-24 DIAGNOSIS — F41.1 GENERALIZED ANXIETY DISORDER WITH PANIC ATTACKS: ICD-10-CM

## 2021-08-24 DIAGNOSIS — Z00.00 HEALTH CARE MAINTENANCE: ICD-10-CM

## 2021-08-24 LAB
ALBUMIN SERPL BCP-MCNC: 3.9 G/DL (ref 3.5–5.2)
ALP SERPL-CCNC: 41 U/L (ref 55–135)
ALT SERPL W/O P-5'-P-CCNC: 17 U/L (ref 10–44)
ANION GAP SERPL CALC-SCNC: 11 MMOL/L (ref 8–16)
AST SERPL-CCNC: 24 U/L (ref 10–40)
BILIRUB SERPL-MCNC: 0.4 MG/DL (ref 0.1–1)
BUN SERPL-MCNC: 13 MG/DL (ref 6–20)
CALCIUM SERPL-MCNC: 9.2 MG/DL (ref 8.7–10.5)
CHLORIDE SERPL-SCNC: 105 MMOL/L (ref 95–110)
CO2 SERPL-SCNC: 23 MMOL/L (ref 23–29)
CREAT SERPL-MCNC: 0.8 MG/DL (ref 0.5–1.4)
EST. GFR  (AFRICAN AMERICAN): >60 ML/MIN/1.73 M^2
EST. GFR  (NON AFRICAN AMERICAN): >60 ML/MIN/1.73 M^2
GLUCOSE SERPL-MCNC: 83 MG/DL (ref 70–110)
POTASSIUM SERPL-SCNC: 3.7 MMOL/L (ref 3.5–5.1)
PROT SERPL-MCNC: 6.9 G/DL (ref 6–8.4)
SODIUM SERPL-SCNC: 139 MMOL/L (ref 136–145)
TSH SERPL DL<=0.005 MIU/L-ACNC: 1.43 UIU/ML (ref 0.4–4)

## 2021-08-24 PROCEDURE — 3008F BODY MASS INDEX DOCD: CPT | Mod: CPTII,S$GLB,, | Performed by: INTERNAL MEDICINE

## 2021-08-24 PROCEDURE — 3074F SYST BP LT 130 MM HG: CPT | Mod: CPTII,S$GLB,, | Performed by: INTERNAL MEDICINE

## 2021-08-24 PROCEDURE — 36415 COLL VENOUS BLD VENIPUNCTURE: CPT | Performed by: INTERNAL MEDICINE

## 2021-08-24 PROCEDURE — 99395 PR PREVENTIVE VISIT,EST,18-39: ICD-10-PCS | Mod: S$GLB,,, | Performed by: INTERNAL MEDICINE

## 2021-08-24 PROCEDURE — 99395 PREV VISIT EST AGE 18-39: CPT | Mod: S$GLB,,, | Performed by: INTERNAL MEDICINE

## 2021-08-24 PROCEDURE — 3078F PR MOST RECENT DIASTOLIC BLOOD PRESSURE < 80 MM HG: ICD-10-PCS | Mod: CPTII,S$GLB,, | Performed by: INTERNAL MEDICINE

## 2021-08-24 PROCEDURE — 1159F MED LIST DOCD IN RCRD: CPT | Mod: CPTII,S$GLB,, | Performed by: INTERNAL MEDICINE

## 2021-08-24 PROCEDURE — 84443 ASSAY THYROID STIM HORMONE: CPT | Performed by: INTERNAL MEDICINE

## 2021-08-24 PROCEDURE — 80053 COMPREHEN METABOLIC PANEL: CPT | Performed by: INTERNAL MEDICINE

## 2021-08-24 PROCEDURE — 86803 HEPATITIS C AB TEST: CPT | Performed by: INTERNAL MEDICINE

## 2021-08-24 PROCEDURE — 3074F PR MOST RECENT SYSTOLIC BLOOD PRESSURE < 130 MM HG: ICD-10-PCS | Mod: CPTII,S$GLB,, | Performed by: INTERNAL MEDICINE

## 2021-08-24 PROCEDURE — 3008F PR BODY MASS INDEX (BMI) DOCUMENTED: ICD-10-PCS | Mod: CPTII,S$GLB,, | Performed by: INTERNAL MEDICINE

## 2021-08-24 PROCEDURE — 1160F RVW MEDS BY RX/DR IN RCRD: CPT | Mod: CPTII,S$GLB,, | Performed by: INTERNAL MEDICINE

## 2021-08-24 PROCEDURE — 3078F DIAST BP <80 MM HG: CPT | Mod: CPTII,S$GLB,, | Performed by: INTERNAL MEDICINE

## 2021-08-24 PROCEDURE — 99999 PR PBB SHADOW E&M-EST. PATIENT-LVL III: CPT | Mod: PBBFAC,,, | Performed by: INTERNAL MEDICINE

## 2021-08-24 PROCEDURE — 1160F PR REVIEW ALL MEDS BY PRESCRIBER/CLIN PHARMACIST DOCUMENTED: ICD-10-PCS | Mod: CPTII,S$GLB,, | Performed by: INTERNAL MEDICINE

## 2021-08-24 PROCEDURE — 99999 PR PBB SHADOW E&M-EST. PATIENT-LVL III: ICD-10-PCS | Mod: PBBFAC,,, | Performed by: INTERNAL MEDICINE

## 2021-08-24 PROCEDURE — 1159F PR MEDICATION LIST DOCUMENTED IN MEDICAL RECORD: ICD-10-PCS | Mod: CPTII,S$GLB,, | Performed by: INTERNAL MEDICINE

## 2021-08-25 LAB — HCV AB SERPL QL IA: NEGATIVE

## 2021-09-29 ENCOUNTER — IMMUNIZATION (OUTPATIENT)
Dept: INTERNAL MEDICINE | Facility: CLINIC | Age: 31
End: 2021-09-29
Payer: COMMERCIAL

## 2021-09-29 DIAGNOSIS — Z23 NEED FOR VACCINATION: Primary | ICD-10-CM

## 2021-09-29 PROCEDURE — 0003A COVID-19, MRNA, LNP-S, PF, 30 MCG/0.3 ML DOSE VACCINE: CPT | Mod: CV19,PBBFAC | Performed by: INTERNAL MEDICINE

## 2021-09-29 PROCEDURE — 91300 COVID-19, MRNA, LNP-S, PF, 30 MCG/0.3 ML DOSE VACCINE: CPT | Mod: PBBFAC | Performed by: INTERNAL MEDICINE

## 2022-03-24 DIAGNOSIS — F41.9 ANXIETY: ICD-10-CM

## 2022-03-24 RX ORDER — VENLAFAXINE HYDROCHLORIDE 150 MG/1
150 CAPSULE, EXTENDED RELEASE ORAL DAILY
Qty: 90 CAPSULE | Refills: 3 | Status: SHIPPED | OUTPATIENT
Start: 2022-03-24 | End: 2023-02-12 | Stop reason: SDUPTHER

## 2022-03-24 NOTE — TELEPHONE ENCOUNTER
No new care gaps identified.  Powered by [employer]. Reference number: [id number].   3/24/2022 7:47:41 AM CDT

## 2022-03-29 ENCOUNTER — OFFICE VISIT (OUTPATIENT)
Dept: OBSTETRICS AND GYNECOLOGY | Facility: CLINIC | Age: 32
End: 2022-03-29
Payer: COMMERCIAL

## 2022-03-29 VITALS
BODY MASS INDEX: 23.14 KG/M2 | SYSTOLIC BLOOD PRESSURE: 106 MMHG | WEIGHT: 161.63 LBS | HEIGHT: 70 IN | DIASTOLIC BLOOD PRESSURE: 60 MMHG

## 2022-03-29 DIAGNOSIS — Z01.419 WELL WOMAN EXAM WITH ROUTINE GYNECOLOGICAL EXAM: Primary | ICD-10-CM

## 2022-03-29 DIAGNOSIS — Z30.41 ENCOUNTER FOR SURVEILLANCE OF CONTRACEPTIVE PILLS: ICD-10-CM

## 2022-03-29 PROCEDURE — 3074F SYST BP LT 130 MM HG: CPT | Mod: CPTII,S$GLB,, | Performed by: NURSE PRACTITIONER

## 2022-03-29 PROCEDURE — 99395 PREV VISIT EST AGE 18-39: CPT | Mod: S$GLB,,, | Performed by: NURSE PRACTITIONER

## 2022-03-29 PROCEDURE — 3074F PR MOST RECENT SYSTOLIC BLOOD PRESSURE < 130 MM HG: ICD-10-PCS | Mod: CPTII,S$GLB,, | Performed by: NURSE PRACTITIONER

## 2022-03-29 PROCEDURE — 3078F PR MOST RECENT DIASTOLIC BLOOD PRESSURE < 80 MM HG: ICD-10-PCS | Mod: CPTII,S$GLB,, | Performed by: NURSE PRACTITIONER

## 2022-03-29 PROCEDURE — 99999 PR PBB SHADOW E&M-EST. PATIENT-LVL III: ICD-10-PCS | Mod: PBBFAC,,, | Performed by: NURSE PRACTITIONER

## 2022-03-29 PROCEDURE — 3008F BODY MASS INDEX DOCD: CPT | Mod: CPTII,S$GLB,, | Performed by: NURSE PRACTITIONER

## 2022-03-29 PROCEDURE — 1159F PR MEDICATION LIST DOCUMENTED IN MEDICAL RECORD: ICD-10-PCS | Mod: CPTII,S$GLB,, | Performed by: NURSE PRACTITIONER

## 2022-03-29 PROCEDURE — 3078F DIAST BP <80 MM HG: CPT | Mod: CPTII,S$GLB,, | Performed by: NURSE PRACTITIONER

## 2022-03-29 PROCEDURE — 99999 PR PBB SHADOW E&M-EST. PATIENT-LVL III: CPT | Mod: PBBFAC,,, | Performed by: NURSE PRACTITIONER

## 2022-03-29 PROCEDURE — 3008F PR BODY MASS INDEX (BMI) DOCUMENTED: ICD-10-PCS | Mod: CPTII,S$GLB,, | Performed by: NURSE PRACTITIONER

## 2022-03-29 PROCEDURE — 99395 PR PREVENTIVE VISIT,EST,18-39: ICD-10-PCS | Mod: S$GLB,,, | Performed by: NURSE PRACTITIONER

## 2022-03-29 PROCEDURE — 1159F MED LIST DOCD IN RCRD: CPT | Mod: CPTII,S$GLB,, | Performed by: NURSE PRACTITIONER

## 2022-03-29 RX ORDER — NORETHINDRONE ACETATE AND ETHINYL ESTRADIOL .03; 1.5 MG/1; MG/1
1 TABLET ORAL DAILY
Qty: 84 EACH | Refills: 3 | Status: SHIPPED | OUTPATIENT
Start: 2022-03-29 | End: 2023-03-07

## 2022-03-29 NOTE — PROGRESS NOTES
CC: Annual  HPI: Pt is a 31 y.o.  female who presents for routine annual exam. She uses ocps for contraception. She does not want STD screening. No issues or complaints today. Needs ocp refill. Pap is current.     NOTES FROM LAST VISIT WITH ME IN 2021-  CC: Annual  HPI: Pt is a 30 y.o.  female who presents for routine annual exam. She uses OCPs for contraception. She does not want STD screening. No issues today.     FH:   Breast cancer: none  Colon cancer: none  Ovarian cancer: none  Uterine cancer: none    HPV vaccine: yes  COVID vaccine: yes    Last pap smear: 2021 nilm, hpv negative  History of abnormal pap smears: no  Colonoscopy: na  DEXA: na  Mammogram: na  STD history: no  Birth control: ocps  OB history: G0  Tobacco use: no    ROS:  GENERAL: Feeling well overall. Denies fever or chills.   SKIN: Denies rash or lesions.   HEAD: Denies head injury or headache.   NODES: Denies enlarged lymph nodes.   CHEST: Denies chest pain or shortness of breath.   CARDIOVASCULAR: Denies palpitations or left sided chest pain.   ABDOMEN: No abdominal pain, constipation, diarrhea, nausea, vomiting or rectal bleeding.   URINARY: No dysuria, hematuria, or burning on urination.  REPRODUCTIVE: See HPI.   BREASTS: Denies pain, lumps, or nipple discharge.   HEMATOLOGIC: No easy bruisability or excessive bleeding.   MUSCULOSKELETAL: Denies joint pain or swelling.   NEUROLOGIC: Denies syncope or weakness.   PSYCHIATRIC: Denies depression, anxiety or mood swings.    PE:   APPEARANCE: Well nourished, well developed, White female in no acute distress.  NODES: no cervical, supraclavicular, or inguinal lymphadenopathy  BREASTS: Symmetrical, no skin changes or visible lesions. No palpable masses, nipple discharge or adenopathy bilaterally.  ABDOMEN: Soft. No tenderness or masses. No distention. No hernias palpated. No CVA tenderness.  VULVA: No lesions. Normal external female genitalia.  URETHRAL MEATUS: Normal size and location, no lesions,  no prolapse.  URETHRA: No masses, tenderness, or prolapse.  VAGINA: Moist. No lesions or lacerations noted. No abnormal discharge present. No odor present.   CERVIX: No lesions or discharge. No cervical motion tenderness.   UTERUS: Normal size, regular shape, mobile, non-tender.  ADNEXA: No tenderness. No fullness or masses palpated in the adnexal regions.   ANUS PERINEUM: Normal.      Diagnosis:  1. Well woman exam with routine gynecological exam    2. Encounter for surveillance of contraceptive pills        Plan:     Orders Placed This Encounter    norethindrone ac-eth estradioL (LOESTRIN 1.5/30, 21,) 1.5-30 mg-mcg Tab     1. Pap current  2. C/w ocps    Patient was counseled today on the new ACS guidelines for cervical cytology screening as well as the current recommendations for breast cancer screening. She was counseled to follow up with her PCP for other routine health maintenance. Counseling session lasted approximately 10 minutes, and all her questions were answered.  For women over the age of 65, you can stop having cervical cancer screenings if you have never had abnormal cervical cells or cervical cancer, and youve had three negative Pap tests in a row. (You also can stop screening if youve had two negative Pap and HPV tests in a row in the past 10 years, with at least one test in the past 5 years.),    Follow-up with me in 1 year for routine exam; pap in 2 years.

## 2022-09-20 DIAGNOSIS — L70.0 ACNE VULGARIS: ICD-10-CM

## 2022-09-21 RX ORDER — TRETINOIN 0.25 MG/G
CREAM TOPICAL NIGHTLY
Qty: 45 G | Refills: 5 | Status: SHIPPED | OUTPATIENT
Start: 2022-09-21 | End: 2023-12-11

## 2022-09-22 ENCOUNTER — TELEPHONE (OUTPATIENT)
Dept: PHARMACY | Facility: CLINIC | Age: 32
End: 2022-09-22
Payer: COMMERCIAL

## 2022-09-22 NOTE — TELEPHONE ENCOUNTER
DOCUMENTATION ONLY  Tretinoin 0.025% cream  Approval date: 9/21/2022 to 9/20/2023  Case ID# [id number]

## 2022-09-30 ENCOUNTER — LAB VISIT (OUTPATIENT)
Dept: LAB | Facility: HOSPITAL | Age: 32
End: 2022-09-30
Attending: INTERNAL MEDICINE
Payer: COMMERCIAL

## 2022-09-30 ENCOUNTER — OFFICE VISIT (OUTPATIENT)
Dept: INTERNAL MEDICINE | Facility: CLINIC | Age: 32
End: 2022-09-30
Payer: COMMERCIAL

## 2022-09-30 ENCOUNTER — IMMUNIZATION (OUTPATIENT)
Dept: INTERNAL MEDICINE | Facility: CLINIC | Age: 32
End: 2022-09-30
Payer: COMMERCIAL

## 2022-09-30 VITALS
OXYGEN SATURATION: 99 % | BODY MASS INDEX: 23.68 KG/M2 | HEIGHT: 70 IN | HEART RATE: 63 BPM | SYSTOLIC BLOOD PRESSURE: 116 MMHG | WEIGHT: 165.44 LBS | DIASTOLIC BLOOD PRESSURE: 82 MMHG

## 2022-09-30 DIAGNOSIS — Z23 NEED FOR VACCINATION: Primary | ICD-10-CM

## 2022-09-30 DIAGNOSIS — Z00.00 HEALTH CARE MAINTENANCE: ICD-10-CM

## 2022-09-30 DIAGNOSIS — Z00.00 HEALTH CARE MAINTENANCE: Primary | ICD-10-CM

## 2022-09-30 LAB
ALBUMIN SERPL BCP-MCNC: 4.1 G/DL (ref 3.5–5.2)
ALP SERPL-CCNC: 41 U/L (ref 55–135)
ALT SERPL W/O P-5'-P-CCNC: 14 U/L (ref 10–44)
ANION GAP SERPL CALC-SCNC: 9 MMOL/L (ref 8–16)
AST SERPL-CCNC: 22 U/L (ref 10–40)
BILIRUB SERPL-MCNC: 0.4 MG/DL (ref 0.1–1)
BUN SERPL-MCNC: 13 MG/DL (ref 6–20)
CALCIUM SERPL-MCNC: 9.2 MG/DL (ref 8.7–10.5)
CHLORIDE SERPL-SCNC: 105 MMOL/L (ref 95–110)
CO2 SERPL-SCNC: 22 MMOL/L (ref 23–29)
CREAT SERPL-MCNC: 0.9 MG/DL (ref 0.5–1.4)
EST. GFR  (NO RACE VARIABLE): >60 ML/MIN/1.73 M^2
GLUCOSE SERPL-MCNC: 87 MG/DL (ref 70–110)
POTASSIUM SERPL-SCNC: 4.6 MMOL/L (ref 3.5–5.1)
PROT SERPL-MCNC: 7.1 G/DL (ref 6–8.4)
SODIUM SERPL-SCNC: 136 MMOL/L (ref 136–145)
TSH SERPL DL<=0.005 MIU/L-ACNC: 0.62 UIU/ML (ref 0.4–4)

## 2022-09-30 PROCEDURE — 91312 COVID-19, MRNA, LNP-S, BIVALENT BOOSTER, PF, 30 MCG/0.3 ML DOSE: ICD-10-PCS | Mod: S$GLB,,, | Performed by: INTERNAL MEDICINE

## 2022-09-30 PROCEDURE — 84443 ASSAY THYROID STIM HORMONE: CPT | Performed by: INTERNAL MEDICINE

## 2022-09-30 PROCEDURE — 90715 TDAP VACCINE 7 YRS/> IM: CPT | Mod: S$GLB,,, | Performed by: INTERNAL MEDICINE

## 2022-09-30 PROCEDURE — 99999 PR PBB SHADOW E&M-EST. PATIENT-LVL IV: ICD-10-PCS | Mod: PBBFAC,,, | Performed by: INTERNAL MEDICINE

## 2022-09-30 PROCEDURE — 1160F PR REVIEW ALL MEDS BY PRESCRIBER/CLIN PHARMACIST DOCUMENTED: ICD-10-PCS | Mod: CPTII,S$GLB,, | Performed by: INTERNAL MEDICINE

## 2022-09-30 PROCEDURE — 3074F PR MOST RECENT SYSTOLIC BLOOD PRESSURE < 130 MM HG: ICD-10-PCS | Mod: CPTII,S$GLB,, | Performed by: INTERNAL MEDICINE

## 2022-09-30 PROCEDURE — 3008F PR BODY MASS INDEX (BMI) DOCUMENTED: ICD-10-PCS | Mod: CPTII,S$GLB,, | Performed by: INTERNAL MEDICINE

## 2022-09-30 PROCEDURE — 90471 TDAP VACCINE GREATER THAN OR EQUAL TO 7YO IM: ICD-10-PCS | Mod: S$GLB,,, | Performed by: INTERNAL MEDICINE

## 2022-09-30 PROCEDURE — 1159F MED LIST DOCD IN RCRD: CPT | Mod: CPTII,S$GLB,, | Performed by: INTERNAL MEDICINE

## 2022-09-30 PROCEDURE — 99395 PREV VISIT EST AGE 18-39: CPT | Mod: 25,S$GLB,, | Performed by: INTERNAL MEDICINE

## 2022-09-30 PROCEDURE — 3074F SYST BP LT 130 MM HG: CPT | Mod: CPTII,S$GLB,, | Performed by: INTERNAL MEDICINE

## 2022-09-30 PROCEDURE — 90715 TDAP VACCINE GREATER THAN OR EQUAL TO 7YO IM: ICD-10-PCS | Mod: S$GLB,,, | Performed by: INTERNAL MEDICINE

## 2022-09-30 PROCEDURE — 80053 COMPREHEN METABOLIC PANEL: CPT | Performed by: INTERNAL MEDICINE

## 2022-09-30 PROCEDURE — 36415 COLL VENOUS BLD VENIPUNCTURE: CPT | Performed by: INTERNAL MEDICINE

## 2022-09-30 PROCEDURE — 0124A COVID-19, MRNA, LNP-S, BIVALENT BOOSTER, PF, 30 MCG/0.3 ML DOSE: CPT | Mod: CV19,PBBFAC | Performed by: INTERNAL MEDICINE

## 2022-09-30 PROCEDURE — 99999 PR PBB SHADOW E&M-EST. PATIENT-LVL IV: CPT | Mod: PBBFAC,,, | Performed by: INTERNAL MEDICINE

## 2022-09-30 PROCEDURE — 91312 COVID-19, MRNA, LNP-S, BIVALENT BOOSTER, PF, 30 MCG/0.3 ML DOSE: CPT | Mod: S$GLB,,, | Performed by: INTERNAL MEDICINE

## 2022-09-30 PROCEDURE — 3079F DIAST BP 80-89 MM HG: CPT | Mod: CPTII,S$GLB,, | Performed by: INTERNAL MEDICINE

## 2022-09-30 PROCEDURE — 3079F PR MOST RECENT DIASTOLIC BLOOD PRESSURE 80-89 MM HG: ICD-10-PCS | Mod: CPTII,S$GLB,, | Performed by: INTERNAL MEDICINE

## 2022-09-30 PROCEDURE — 90471 IMMUNIZATION ADMIN: CPT | Mod: S$GLB,,, | Performed by: INTERNAL MEDICINE

## 2022-09-30 PROCEDURE — 99395 PR PREVENTIVE VISIT,EST,18-39: ICD-10-PCS | Mod: 25,S$GLB,, | Performed by: INTERNAL MEDICINE

## 2022-09-30 PROCEDURE — 1160F RVW MEDS BY RX/DR IN RCRD: CPT | Mod: CPTII,S$GLB,, | Performed by: INTERNAL MEDICINE

## 2022-09-30 PROCEDURE — 3008F BODY MASS INDEX DOCD: CPT | Mod: CPTII,S$GLB,, | Performed by: INTERNAL MEDICINE

## 2022-09-30 PROCEDURE — 1159F PR MEDICATION LIST DOCUMENTED IN MEDICAL RECORD: ICD-10-PCS | Mod: CPTII,S$GLB,, | Performed by: INTERNAL MEDICINE

## 2022-09-30 NOTE — PROGRESS NOTES
"Subjective:       Patient ID: Norma Manjarrez is a 31 y.o. female.    Chief Complaint: Annual Exam    HPI  Norma Manjarrez is a 31 y.o. female here for a yearly preventative healthcare visit.     Anxiety  Effexor  Rare clonazepam - has only needed less than monthly.    Nephew born earlier this week.  Review of Systems   Constitutional:  Negative for activity change and unexpected weight change.   HENT:  Negative for hearing loss, rhinorrhea and trouble swallowing.    Eyes:  Negative for discharge and visual disturbance.   Respiratory:  Negative for chest tightness and wheezing.    Cardiovascular:  Negative for chest pain and palpitations.   Gastrointestinal:  Negative for blood in stool, constipation, diarrhea and vomiting.   Endocrine: Negative for polydipsia and polyuria.   Genitourinary:  Negative for difficulty urinating, dysuria, hematuria and menstrual problem.   Musculoskeletal:  Negative for arthralgias, joint swelling and neck pain.   Neurological:  Negative for weakness and headaches.   Psychiatric/Behavioral:  Negative for confusion and dysphoric mood.      Objective:   /82 (BP Location: Right arm, Patient Position: Sitting, BP Method: Medium (Manual))   Pulse 63   Ht 5' 10" (1.778 m)   Wt 75 kg (165 lb 7.3 oz)   SpO2 99%   BMI 23.74 kg/m²      Physical Exam  Vitals reviewed.   Constitutional:       Appearance: She is well-developed.   HENT:      Head: Normocephalic and atraumatic.   Eyes:      Conjunctiva/sclera: Conjunctivae normal.      Pupils: Pupils are equal, round, and reactive to light.   Neck:      Thyroid: No thyromegaly.   Cardiovascular:      Rate and Rhythm: Normal rate and regular rhythm.      Heart sounds: Normal heart sounds. No murmur heard.  Pulmonary:      Effort: Pulmonary effort is normal. No respiratory distress.      Breath sounds: Normal breath sounds. No wheezing.   Abdominal:      General: There is no distension.      Palpations: Abdomen is soft.    "   Tenderness: There is no abdominal tenderness. There is no rebound.   Musculoskeletal:         General: No swelling or deformity. Normal range of motion.      Cervical back: Neck supple.   Lymphadenopathy:      Cervical: No cervical adenopathy.   Skin:     General: Skin is warm and dry.      Findings: No rash.   Neurological:      Mental Status: She is alert and oriented to person, place, and time.   Psychiatric:         Thought Content: Thought content normal.         Judgment: Judgment normal.       Assessment:       1. Health care maintenance        Plan:       Norma was seen today for annual exam.    Diagnoses and all orders for this visit:    Health care maintenance  -     Comprehensive Metabolic Panel; Future  -     TSH; Future    Other orders  -     (In Office Administered) Tdap Vaccine          Labs  Flu - will do with employee health  Covid booster today  Tdap today

## 2023-02-12 DIAGNOSIS — F41.9 ANXIETY: ICD-10-CM

## 2023-02-12 NOTE — TELEPHONE ENCOUNTER
No new care gaps identified.  [facility] Embedded Care Gaps. Reference number: [id number]. 2/12/2023   11:56:10 AM CST

## 2023-02-13 RX ORDER — VENLAFAXINE HYDROCHLORIDE 150 MG/1
150 CAPSULE, EXTENDED RELEASE ORAL DAILY
Qty: 90 CAPSULE | Refills: 1 | Status: SHIPPED | OUTPATIENT
Start: 2023-02-13 | End: 2023-08-27 | Stop reason: SDUPTHER

## 2023-02-13 NOTE — TELEPHONE ENCOUNTER
Refill Decision Note   Norma Manjarrez  is requesting a refill authorization.  Brief Assessment and Rationale for Refill:  Approve     Medication Therapy Plan:       Medication Reconciliation Completed: No   Comments:     No Care Gaps recommended.     Note composed:12:18 PM 02/13/2023

## 2023-03-07 ENCOUNTER — OFFICE VISIT (OUTPATIENT)
Dept: OBSTETRICS AND GYNECOLOGY | Facility: CLINIC | Age: 33
End: 2023-03-07
Payer: COMMERCIAL

## 2023-03-07 VITALS
DIASTOLIC BLOOD PRESSURE: 89 MMHG | BODY MASS INDEX: 23.25 KG/M2 | WEIGHT: 162.06 LBS | SYSTOLIC BLOOD PRESSURE: 132 MMHG

## 2023-03-07 DIAGNOSIS — Z01.419 WELL WOMAN EXAM WITH ROUTINE GYNECOLOGICAL EXAM: Primary | ICD-10-CM

## 2023-03-07 DIAGNOSIS — N89.8 VAGINAL DISCHARGE: ICD-10-CM

## 2023-03-07 PROCEDURE — 99999 PR PBB SHADOW E&M-EST. PATIENT-LVL III: CPT | Mod: PBBFAC,,, | Performed by: NURSE PRACTITIONER

## 2023-03-07 PROCEDURE — 99395 PREV VISIT EST AGE 18-39: CPT | Mod: S$GLB,,, | Performed by: NURSE PRACTITIONER

## 2023-03-07 PROCEDURE — 3008F PR BODY MASS INDEX (BMI) DOCUMENTED: ICD-10-PCS | Mod: CPTII,S$GLB,, | Performed by: NURSE PRACTITIONER

## 2023-03-07 PROCEDURE — 3075F SYST BP GE 130 - 139MM HG: CPT | Mod: CPTII,S$GLB,, | Performed by: NURSE PRACTITIONER

## 2023-03-07 PROCEDURE — 3079F PR MOST RECENT DIASTOLIC BLOOD PRESSURE 80-89 MM HG: ICD-10-PCS | Mod: CPTII,S$GLB,, | Performed by: NURSE PRACTITIONER

## 2023-03-07 PROCEDURE — 99395 PR PREVENTIVE VISIT,EST,18-39: ICD-10-PCS | Mod: S$GLB,,, | Performed by: NURSE PRACTITIONER

## 2023-03-07 PROCEDURE — 1160F RVW MEDS BY RX/DR IN RCRD: CPT | Mod: CPTII,S$GLB,, | Performed by: NURSE PRACTITIONER

## 2023-03-07 PROCEDURE — 1160F PR REVIEW ALL MEDS BY PRESCRIBER/CLIN PHARMACIST DOCUMENTED: ICD-10-PCS | Mod: CPTII,S$GLB,, | Performed by: NURSE PRACTITIONER

## 2023-03-07 PROCEDURE — 3075F PR MOST RECENT SYSTOLIC BLOOD PRESS GE 130-139MM HG: ICD-10-PCS | Mod: CPTII,S$GLB,, | Performed by: NURSE PRACTITIONER

## 2023-03-07 PROCEDURE — 1159F PR MEDICATION LIST DOCUMENTED IN MEDICAL RECORD: ICD-10-PCS | Mod: CPTII,S$GLB,, | Performed by: NURSE PRACTITIONER

## 2023-03-07 PROCEDURE — 3008F BODY MASS INDEX DOCD: CPT | Mod: CPTII,S$GLB,, | Performed by: NURSE PRACTITIONER

## 2023-03-07 PROCEDURE — 81514 NFCT DS BV&VAGINITIS DNA ALG: CPT | Performed by: NURSE PRACTITIONER

## 2023-03-07 PROCEDURE — 3079F DIAST BP 80-89 MM HG: CPT | Mod: CPTII,S$GLB,, | Performed by: NURSE PRACTITIONER

## 2023-03-07 PROCEDURE — 1159F MED LIST DOCD IN RCRD: CPT | Mod: CPTII,S$GLB,, | Performed by: NURSE PRACTITIONER

## 2023-03-07 PROCEDURE — 99999 PR PBB SHADOW E&M-EST. PATIENT-LVL III: ICD-10-PCS | Mod: PBBFAC,,, | Performed by: NURSE PRACTITIONER

## 2023-03-07 NOTE — PROGRESS NOTES
CC: Annual  HPI: Pt is a 32 y.o.  female who presents for routine annual exam. She uses cocs for contraception. She does not want STD screening. Recently , planning to stop ocps soon and is open to pregnancy. Has not had a period in years due to pills so curious what they will be like. Report some vaginal itching x 1 month. On Effexor for anxiety, this causes some vaginal dryness. She uses lubrication with intercourse, for the past year she has some spotting with intercourse.     FH:   Breast cancer: none  Colon cancer: none  Ovarian cancer: none  Uterine cancer: none    HPV vaccine: yes  COVID vaccine: yes     Last pap smear: 2021 nilm, hpv negative  History of abnormal pap smears: no  Colonoscopy: na  DEXA: na  Mammogram: na  STD history: no  Birth control: ocps  OB history: G0  Tobacco use: no    ROS:  GENERAL: Feeling well overall. Denies fever or chills.   SKIN: Denies rash or lesions.   HEAD: Denies head injury or headache.   NODES: Denies enlarged lymph nodes.   CHEST: Denies chest pain or shortness of breath.   CARDIOVASCULAR: Denies palpitations or left sided chest pain.   ABDOMEN: No abdominal pain, constipation, diarrhea, nausea, vomiting or rectal bleeding.   URINARY: No dysuria, hematuria, or burning on urination.  REPRODUCTIVE: See HPI.   BREASTS: Denies pain, lumps, or nipple discharge.   HEMATOLOGIC: No easy bruisability or excessive bleeding.   MUSCULOSKELETAL: Denies joint pain or swelling.   NEUROLOGIC: Denies syncope or weakness.   PSYCHIATRIC: Denies depression, anxiety or mood swings.    PE:   APPEARANCE: Well nourished, well developed, White female in no acute distress.  NODES: no cervical, supraclavicular, or inguinal lymphadenopathy  BREASTS: Symmetrical, no skin changes or visible lesions. No palpable masses, nipple discharge or adenopathy bilaterally.  ABDOMEN: Soft. No tenderness or masses. No distention. No hernias palpated. No CVA tenderness.  VULVA: No lesions. Normal external  female genitalia.  URETHRAL MEATUS: Normal size and location, no lesions, no prolapse.  URETHRA: No masses, tenderness, or prolapse.  VAGINA: Moist. No lesions or lacerations noted. Small amount of yeast like discharge. No odor present.   CERVIX: No lesions or discharge. No cervical motion tenderness. Some friability noted  UTERUS: Normal size, regular shape, mobile, non-tender.  ADNEXA: No tenderness. No fullness or masses palpated in the adnexal regions.   ANUS PERINEUM: Normal.      Diagnosis:  1. Well woman exam with routine gynecological exam    2. Vaginal discharge      Orders Placed This Encounter    Vaginosis Screen by DNA Probe     Plan:   Pap current  C/w ocps  Affirm pending, probably yeast    Patient was counseled today on the new ACS guidelines for cervical cytology screening as well as the current recommendations for breast cancer screening. She was counseled to follow up with her PCP for other routine health maintenance. Counseling session lasted approximately 10 minutes, and all her questions were answered.  For women over the age of 65, you can stop having cervical cancer screenings if you have never had abnormal cervical cells or cervical cancer, and youve had three negative Pap tests in a row. (You also can stop screening if youve had two negative Pap and HPV tests in a row in the past 10 years, with at least one test in the past 5 years.),    Follow-up with me in 1 year for routine exam; pap in 1 year.     I spent a total of 20 minutes on the day of the visit.This includes face to face time and non-face to face time preparing to see the patient (eg, review of tests), obtaining and/or reviewing separately obtained history, documenting clinical information in the electronic or other health record, independently interpreting results and communicating results to the patient/family/caregiver, or care coordinator.    As of April 1, 2021, the Cures Act has been passed nationally. This new law requires  that all doctors progress notes, lab results, pathology reports and radiology reports be released IMMEDIATELY to the patient in the patient portal. That means that the results are released to you at the EXACT same time they are released to me. Therefore, with all of the patients that I have I am not able to reply to each patient exactly when the results come in. So there will be a delay from when you see the results to when I see them and have time to come up with a response to send you. Also I only see these results when I am on the computer at work. So if the results come in over the weekend or after 5 pm of a work day, I will not see them until the next business day. As you can tell, this is a challenge as a provider to give every patient the quick response they hope for and deserve. So please be patient!     Thanks for your understanding and patience.

## 2023-03-08 DIAGNOSIS — B37.31 VAGINAL YEAST INFECTION: Primary | ICD-10-CM

## 2023-03-08 LAB
BACTERIAL VAGINOSIS DNA: NEGATIVE
CANDIDA GLABRATA DNA: NEGATIVE
CANDIDA KRUSEI DNA: NEGATIVE
CANDIDA RRNA VAG QL PROBE: POSITIVE
T VAGINALIS RRNA GENITAL QL PROBE: NEGATIVE

## 2023-03-08 RX ORDER — FLUCONAZOLE 200 MG/1
200 TABLET ORAL
Qty: 2 TABLET | Refills: 0 | Status: SHIPPED | OUTPATIENT
Start: 2023-03-08 | End: 2023-03-15

## 2023-08-27 DIAGNOSIS — F41.9 ANXIETY: ICD-10-CM

## 2023-08-27 NOTE — TELEPHONE ENCOUNTER
Care Due:                  Date            Visit Type   Department     Provider  --------------------------------------------------------------------------------                                MYCHART                              ANNUAL                              CHECKUP/PHY  [facility] INTERNAL  Last Visit: 09-      [facility]       KARLEE NG                              [facility]                              ANNUAL                              CHECKUP/PHY  [facility] INTERNAL  Next Visit: 12-      [facility]       KARLEE NG                                                            Last  Test          Frequency    Reason                     Performed    Due Date  --------------------------------------------------------------------------------    Office Visit  12 months..  venlafaxine..............  09- 09-    Cr..........  12 months..  venlafaxine..............  09- 09-    [facility] Embedded Care Due Messages. Reference number: [id number].   8/27/2023 7:54:21 AM CDT

## 2023-08-28 RX ORDER — VENLAFAXINE HYDROCHLORIDE 150 MG/1
150 CAPSULE, EXTENDED RELEASE ORAL DAILY
Qty: 90 CAPSULE | Refills: 0 | Status: SHIPPED | OUTPATIENT
Start: 2023-08-28 | End: 2023-11-27 | Stop reason: SDUPTHER

## 2023-08-28 NOTE — TELEPHONE ENCOUNTER
Provider Staff:  Action required for this patient     Please see care gap opportunities below in Care Due Message.    Thanks!  [facility]     Appointments      Date Provider   Last Visit   9/30/2022 Krupa Rodriguez MD   Next Visit   12/11/2023 Krupa Rodriguez MD     Refill Decision Note   Norma Manjarrez  is requesting a refill authorization.  Brief Assessment and Rationale for Refill:  Approve     Medication Therapy Plan:  FOVS      Comments:     Note composed:11:54 AM 08/28/2023

## 2023-11-27 DIAGNOSIS — F41.9 ANXIETY: ICD-10-CM

## 2023-11-27 NOTE — TELEPHONE ENCOUNTER
Care Due:                  Date            Visit Type   Department     Provider  --------------------------------------------------------------------------------                                MYCHART                              ANNUAL                              CHECKUP/PHY  [facility] INTERNAL  Last Visit: 09-      [facility]       KARLEE NG                              [facility]                              ANNUAL                              CHECKUP/PHY  [facility] INTERNAL  Next Visit: 12-      [facility]       KARLEE NG                                                            Last  Test          Frequency    Reason                     Performed    Due Date  --------------------------------------------------------------------------------    Cr..........  12 months..  venlafaxine..............  09- 09-    [facility] Embedded Care Due Messages. Reference number: [id number].   11/27/2023 10:50:25 AM CST

## 2023-11-28 RX ORDER — VENLAFAXINE HYDROCHLORIDE 150 MG/1
150 CAPSULE, EXTENDED RELEASE ORAL DAILY
Qty: 90 CAPSULE | Refills: 3 | Status: SHIPPED | OUTPATIENT
Start: 2023-11-28

## 2023-11-28 NOTE — TELEPHONE ENCOUNTER
Refill Routing Note   Medication(s) are not appropriate for processing by [facility] for the following reason(s):        Required labs outdated    [facility] action(s):  Defer     Requires labs : Yes             Appointments  past 12m or future 3m with PCP    Date Provider   Last Visit   9/30/2022 Krupa Rodriguez MD   Next Visit   12/11/2023 Krupa Rodriguez MD   ED visits in past 90 days: 0        Note composed:3:02 AM 11/28/2023

## 2023-12-11 ENCOUNTER — OFFICE VISIT (OUTPATIENT)
Dept: INTERNAL MEDICINE | Facility: CLINIC | Age: 33
End: 2023-12-11
Payer: COMMERCIAL

## 2023-12-11 ENCOUNTER — LAB VISIT (OUTPATIENT)
Dept: LAB | Facility: HOSPITAL | Age: 33
End: 2023-12-11
Attending: INTERNAL MEDICINE
Payer: COMMERCIAL

## 2023-12-11 VITALS
OXYGEN SATURATION: 98 % | HEIGHT: 70 IN | DIASTOLIC BLOOD PRESSURE: 72 MMHG | BODY MASS INDEX: 23.66 KG/M2 | SYSTOLIC BLOOD PRESSURE: 121 MMHG | WEIGHT: 165.25 LBS | HEART RATE: 73 BPM

## 2023-12-11 DIAGNOSIS — Z32.01 POSITIVE PREGNANCY TEST: ICD-10-CM

## 2023-12-11 DIAGNOSIS — Z00.00 HEALTH CARE MAINTENANCE: ICD-10-CM

## 2023-12-11 DIAGNOSIS — Z00.00 HEALTH CARE MAINTENANCE: Primary | ICD-10-CM

## 2023-12-11 LAB
ALBUMIN SERPL BCP-MCNC: 4.2 G/DL (ref 3.5–5.2)
ALP SERPL-CCNC: 40 U/L (ref 55–135)
ALT SERPL W/O P-5'-P-CCNC: 18 U/L (ref 10–44)
ANION GAP SERPL CALC-SCNC: 11 MMOL/L (ref 8–16)
AST SERPL-CCNC: 22 U/L (ref 10–40)
BASOPHILS # BLD AUTO: 0.03 K/UL (ref 0–0.2)
BASOPHILS NFR BLD: 0.5 % (ref 0–1.9)
BILIRUB SERPL-MCNC: 0.4 MG/DL (ref 0.1–1)
BUN SERPL-MCNC: 10 MG/DL (ref 6–20)
CALCIUM SERPL-MCNC: 9.1 MG/DL (ref 8.7–10.5)
CHLORIDE SERPL-SCNC: 105 MMOL/L (ref 95–110)
CO2 SERPL-SCNC: 23 MMOL/L (ref 23–29)
CREAT SERPL-MCNC: 0.8 MG/DL (ref 0.5–1.4)
DIFFERENTIAL METHOD: ABNORMAL
EOSINOPHIL # BLD AUTO: 0.1 K/UL (ref 0–0.5)
EOSINOPHIL NFR BLD: 1.6 % (ref 0–8)
ERYTHROCYTE [DISTWIDTH] IN BLOOD BY AUTOMATED COUNT: 11.8 % (ref 11.5–14.5)
EST. GFR  (NO RACE VARIABLE): >60 ML/MIN/1.73 M^2
ESTIMATED AVG GLUCOSE: 88 MG/DL (ref 68–131)
GLUCOSE SERPL-MCNC: 84 MG/DL (ref 70–110)
HBA1C MFR BLD: 4.7 % (ref 4–5.6)
HCG INTACT+B SERPL-ACNC: 1398 MIU/ML
HCT VFR BLD AUTO: 39.4 % (ref 37–48.5)
HGB BLD-MCNC: 13.6 G/DL (ref 12–16)
IMM GRANULOCYTES # BLD AUTO: 0.01 K/UL (ref 0–0.04)
IMM GRANULOCYTES NFR BLD AUTO: 0.2 % (ref 0–0.5)
LYMPHOCYTES # BLD AUTO: 2 K/UL (ref 1–4.8)
LYMPHOCYTES NFR BLD: 34.5 % (ref 18–48)
MCH RBC QN AUTO: 32.5 PG (ref 27–31)
MCHC RBC AUTO-ENTMCNC: 34.5 G/DL (ref 32–36)
MCV RBC AUTO: 94 FL (ref 82–98)
MONOCYTES # BLD AUTO: 0.5 K/UL (ref 0.3–1)
MONOCYTES NFR BLD: 9 % (ref 4–15)
NEUTROPHILS # BLD AUTO: 3.2 K/UL (ref 1.8–7.7)
NEUTROPHILS NFR BLD: 54.2 % (ref 38–73)
NRBC BLD-RTO: 0 /100 WBC
PLATELET # BLD AUTO: 182 K/UL (ref 150–450)
PMV BLD AUTO: 12.9 FL (ref 9.2–12.9)
POTASSIUM SERPL-SCNC: 3.6 MMOL/L (ref 3.5–5.1)
PROT SERPL-MCNC: 7 G/DL (ref 6–8.4)
RBC # BLD AUTO: 4.19 M/UL (ref 4–5.4)
SODIUM SERPL-SCNC: 139 MMOL/L (ref 136–145)
TSH SERPL DL<=0.005 MIU/L-ACNC: 0.79 UIU/ML (ref 0.4–4)
WBC # BLD AUTO: 5.8 K/UL (ref 3.9–12.7)

## 2023-12-11 PROCEDURE — 99395 PREV VISIT EST AGE 18-39: CPT | Mod: S$GLB,,, | Performed by: INTERNAL MEDICINE

## 2023-12-11 PROCEDURE — 85025 COMPLETE CBC W/AUTO DIFF WBC: CPT | Performed by: INTERNAL MEDICINE

## 2023-12-11 PROCEDURE — 84702 CHORIONIC GONADOTROPIN TEST: CPT | Performed by: INTERNAL MEDICINE

## 2023-12-11 PROCEDURE — 3008F BODY MASS INDEX DOCD: CPT | Mod: CPTII,S$GLB,, | Performed by: INTERNAL MEDICINE

## 2023-12-11 PROCEDURE — 99999 PR PBB SHADOW E&M-EST. PATIENT-LVL III: ICD-10-PCS | Mod: PBBFAC,,, | Performed by: INTERNAL MEDICINE

## 2023-12-11 PROCEDURE — 3074F SYST BP LT 130 MM HG: CPT | Mod: CPTII,S$GLB,, | Performed by: INTERNAL MEDICINE

## 2023-12-11 PROCEDURE — 3078F DIAST BP <80 MM HG: CPT | Mod: CPTII,S$GLB,, | Performed by: INTERNAL MEDICINE

## 2023-12-11 PROCEDURE — 84443 ASSAY THYROID STIM HORMONE: CPT | Performed by: INTERNAL MEDICINE

## 2023-12-11 PROCEDURE — 3008F PR BODY MASS INDEX (BMI) DOCUMENTED: ICD-10-PCS | Mod: CPTII,S$GLB,, | Performed by: INTERNAL MEDICINE

## 2023-12-11 PROCEDURE — 3044F PR MOST RECENT HEMOGLOBIN A1C LEVEL <7.0%: ICD-10-PCS | Mod: CPTII,S$GLB,, | Performed by: INTERNAL MEDICINE

## 2023-12-11 PROCEDURE — 99395 PR PREVENTIVE VISIT,EST,18-39: ICD-10-PCS | Mod: S$GLB,,, | Performed by: INTERNAL MEDICINE

## 2023-12-11 PROCEDURE — 83036 HEMOGLOBIN GLYCOSYLATED A1C: CPT | Performed by: INTERNAL MEDICINE

## 2023-12-11 PROCEDURE — 3078F PR MOST RECENT DIASTOLIC BLOOD PRESSURE < 80 MM HG: ICD-10-PCS | Mod: CPTII,S$GLB,, | Performed by: INTERNAL MEDICINE

## 2023-12-11 PROCEDURE — 3044F HG A1C LEVEL LT 7.0%: CPT | Mod: CPTII,S$GLB,, | Performed by: INTERNAL MEDICINE

## 2023-12-11 PROCEDURE — 99999 PR PBB SHADOW E&M-EST. PATIENT-LVL III: CPT | Mod: PBBFAC,,, | Performed by: INTERNAL MEDICINE

## 2023-12-11 PROCEDURE — 80053 COMPREHEN METABOLIC PANEL: CPT | Performed by: INTERNAL MEDICINE

## 2023-12-11 PROCEDURE — 36415 COLL VENOUS BLD VENIPUNCTURE: CPT | Performed by: INTERNAL MEDICINE

## 2023-12-11 PROCEDURE — 3074F PR MOST RECENT SYSTOLIC BLOOD PRESSURE < 130 MM HG: ICD-10-PCS | Mod: CPTII,S$GLB,, | Performed by: INTERNAL MEDICINE

## 2023-12-11 NOTE — PROGRESS NOTES
"Subjective:       Patient ID: Norma Manjarrez is a 33 y.o. female.    Chief Complaint: Annual Exam    HPI  Norma Manjarrez is a 33 y.o. female here for a yearly preventative healthcare visit.     Anxiety  Effexor-xr 150mg daily  Rare clonazepam - has only needed less than monthly.     Positive pregnancy test  Last week positive test  11/13/23 LMP  Review of Systems   Constitutional:  Negative for activity change and unexpected weight change.   HENT:  Negative for hearing loss, rhinorrhea and trouble swallowing.    Eyes:  Negative for discharge and visual disturbance.   Respiratory:  Negative for chest tightness and wheezing.    Cardiovascular:  Negative for chest pain and palpitations.   Gastrointestinal:  Negative for blood in stool, constipation, diarrhea and vomiting.   Endocrine: Negative for polydipsia and polyuria.   Genitourinary:  Negative for difficulty urinating, dysuria, hematuria and menstrual problem.   Musculoskeletal:  Negative for arthralgias, joint swelling and neck pain.   Neurological:  Negative for weakness and headaches.   Psychiatric/Behavioral:  Negative for confusion and dysphoric mood.        Objective:   /72 (BP Location: Right arm, Patient Position: Sitting, BP Method: Medium (Manual))   Pulse 73   Ht 5' 10" (1.778 m)   Wt 75 kg (165 lb 3.8 oz)   SpO2 98%   BMI 23.71 kg/m²      Physical Exam  Vitals reviewed.   Constitutional:       Appearance: She is well-developed.   HENT:      Head: Normocephalic and atraumatic.   Eyes:      Conjunctiva/sclera: Conjunctivae normal.      Pupils: Pupils are equal, round, and reactive to light.   Neck:      Thyroid: No thyromegaly.   Cardiovascular:      Rate and Rhythm: Normal rate and regular rhythm.      Heart sounds: Normal heart sounds. No murmur heard.  Pulmonary:      Effort: Pulmonary effort is normal. No respiratory distress.      Breath sounds: Normal breath sounds. No wheezing.   Abdominal:      General: Bowel " sounds are normal. There is no distension.      Palpations: Abdomen is soft.      Tenderness: There is no abdominal tenderness.   Musculoskeletal:         General: Normal range of motion.      Cervical back: Neck supple.   Skin:     General: Skin is warm and dry.      Findings: No rash.   Neurological:      General: No focal deficit present.      Mental Status: She is alert and oriented to person, place, and time.   Psychiatric:         Thought Content: Thought content normal.         Judgment: Judgment normal.         Assessment:       1. Health care maintenance    2. Positive pregnancy test        Plan:       1. Health care maintenance  -     CBC Auto Differential; Future; Expected date: 12/11/2023  -     Comprehensive Metabolic Panel; Future; Expected date: 12/11/2023  -     Hemoglobin A1C; Future; Expected date: 12/11/2023  -     TSH; Future; Expected date: 12/11/2023    2. Positive pregnancy test  -     HCG, QUANTITATIVE, PREGNANCY; Future; Expected date: 12/11/2023           You are up to date for your primary preventive health care, and there are no reminders at this time.     Labs   Covid booster

## 2023-12-12 ENCOUNTER — CLINICAL SUPPORT (OUTPATIENT)
Dept: OBSTETRICS AND GYNECOLOGY | Facility: CLINIC | Age: 33
End: 2023-12-12
Payer: COMMERCIAL

## 2023-12-12 DIAGNOSIS — N91.2 AMENORRHEA: Primary | ICD-10-CM

## 2023-12-12 PROCEDURE — 99999 PR PBB SHADOW E&M-EST. PATIENT-LVL II: ICD-10-PCS | Mod: PBBFAC,,,

## 2023-12-12 PROCEDURE — 99999 PR PBB SHADOW E&M-EST. PATIENT-LVL II: CPT | Mod: PBBFAC,,,

## 2024-01-12 ENCOUNTER — OFFICE VISIT (OUTPATIENT)
Dept: OBSTETRICS AND GYNECOLOGY | Facility: CLINIC | Age: 34
End: 2024-01-12
Payer: COMMERCIAL

## 2024-01-12 ENCOUNTER — HOSPITAL ENCOUNTER (OUTPATIENT)
Dept: PERINATAL CARE | Facility: OTHER | Age: 34
Discharge: HOME | End: 2024-01-12
Attending: OBSTETRICS & GYNECOLOGY
Payer: COMMERCIAL

## 2024-01-12 VITALS
SYSTOLIC BLOOD PRESSURE: 134 MMHG | WEIGHT: 161.38 LBS | BODY MASS INDEX: 23.16 KG/M2 | DIASTOLIC BLOOD PRESSURE: 81 MMHG

## 2024-01-12 DIAGNOSIS — O21.9 NAUSEA AND VOMITING DURING PREGNANCY: ICD-10-CM

## 2024-01-12 DIAGNOSIS — N91.4 SECONDARY OLIGOMENORRHEA: Primary | ICD-10-CM

## 2024-01-12 DIAGNOSIS — Z34.01 ENCOUNTER FOR SUPERVISION OF NORMAL FIRST PREGNANCY IN FIRST TRIMESTER: ICD-10-CM

## 2024-01-12 DIAGNOSIS — Z12.4 PAP SMEAR FOR CERVICAL CANCER SCREENING: ICD-10-CM

## 2024-01-12 DIAGNOSIS — N91.2 AMENORRHEA: ICD-10-CM

## 2024-01-12 DIAGNOSIS — Z11.3 ENCOUNTER FOR SCREENING FOR INFECTIONS WITH A PREDOMINANTLY SEXUAL MODE OF TRANSMISSION: ICD-10-CM

## 2024-01-12 DIAGNOSIS — Z32.01 POSITIVE PREGNANCY TEST: ICD-10-CM

## 2024-01-12 PROCEDURE — 99999 PR PBB SHADOW E&M-EST. PATIENT-LVL III: CPT | Mod: PBBFAC,,, | Performed by: NURSE PRACTITIONER

## 2024-01-12 PROCEDURE — 87086 URINE CULTURE/COLONY COUNT: CPT | Performed by: NURSE PRACTITIONER

## 2024-01-12 PROCEDURE — 76801 OB US < 14 WKS SINGLE FETUS: CPT

## 2024-01-12 PROCEDURE — 99214 OFFICE O/P EST MOD 30 MIN: CPT | Mod: S$GLB,,, | Performed by: NURSE PRACTITIONER

## 2024-01-12 PROCEDURE — 1160F RVW MEDS BY RX/DR IN RCRD: CPT | Mod: CPTII,S$GLB,, | Performed by: NURSE PRACTITIONER

## 2024-01-12 PROCEDURE — 3075F SYST BP GE 130 - 139MM HG: CPT | Mod: CPTII,S$GLB,, | Performed by: NURSE PRACTITIONER

## 2024-01-12 PROCEDURE — 1159F MED LIST DOCD IN RCRD: CPT | Mod: CPTII,S$GLB,, | Performed by: NURSE PRACTITIONER

## 2024-01-12 PROCEDURE — 3008F BODY MASS INDEX DOCD: CPT | Mod: CPTII,S$GLB,, | Performed by: NURSE PRACTITIONER

## 2024-01-12 PROCEDURE — 88142 CYTOPATH C/V THIN LAYER: CPT | Performed by: NURSE PRACTITIONER

## 2024-01-12 PROCEDURE — 76801 OB US < 14 WKS SINGLE FETUS: CPT | Mod: 26,,, | Performed by: OBSTETRICS & GYNECOLOGY

## 2024-01-12 PROCEDURE — 87624 HPV HI-RISK TYP POOLED RSLT: CPT | Performed by: NURSE PRACTITIONER

## 2024-01-12 PROCEDURE — 3079F DIAST BP 80-89 MM HG: CPT | Mod: CPTII,S$GLB,, | Performed by: NURSE PRACTITIONER

## 2024-01-12 PROCEDURE — 87491 CHLMYD TRACH DNA AMP PROBE: CPT | Performed by: NURSE PRACTITIONER

## 2024-01-12 RX ORDER — PROMETHAZINE HYDROCHLORIDE 25 MG/1
25 SUPPOSITORY RECTAL EVERY 6 HOURS PRN
Qty: 12 SUPPOSITORY | Refills: 1 | Status: SHIPPED | OUTPATIENT
Start: 2024-01-12 | End: 2025-01-11

## 2024-01-12 RX ORDER — ONDANSETRON 4 MG/1
4 TABLET, ORALLY DISINTEGRATING ORAL EVERY 6 HOURS PRN
Qty: 30 TABLET | Refills: 0 | Status: SHIPPED | OUTPATIENT
Start: 2024-01-12

## 2024-01-12 NOTE — PATIENT INSTRUCTIONS
LABOR AND DELIVERY PHONE NUMBER, [phone] (OPEN 24/7, LOCATED ON 6TH FLOOR OF HOSPITAL)  [address] PHONE NUMBER, [phone] (OPEN MON-FRI, 8a-5p)    1) Eat small frequent meals through the day versus three large meals  2) Try ginger ale or sprite to help settle the stomach   3) Eat crackers or dry toast before getting out of bed in the morning   4) Stay hydrated by drinking plenty of water-do not immediately eat or drink something after vomiting. Give your stomach a rest for 20-30 minutes. Slowly reintroduce ice chips, small sips water, crackers, etc.    5) Try OTC Unisom (use the tablets that have doxylamine not diphenhydramine in them, can use generic brands like Equate) and vitamin B6  (25 mg, can find on [employer]) together at night before bed. This can help with the nausea in the morning and is safe to use during pregnancy. You can also take the vitamin B6 alone, every 8 hours to help with the nausea.     If you are unable to keep anything down and constantly vomiting for more that 24 hours, let the office know so that dehydration can be avoided. We would recommend being seen in the emergency department if this is the case.     CHROMOSOMAL TESTING OPTIONS IN PREGNANCY    The sequential screen is a test done around 12-14 weeks that consists of an ultrasound that measures the nuchal fold (neck thickness) of baby and blood work on the same day. You get a second set of labs done a few weeks later after 15 weeks. Based on all three of these results, they are able to tell you if you are considered to be low risk or high risk regarding neural tube defects and chromosomal abnormalities like Down Syndrome. If you are at all interested, I usually place the order today so we do not miss the window. Someone will give you a phone call in a week or two to schedule this. If you do not want it, just tell them no thank you. This test is typically covered by your insurance and is performed by the Maternal Fetal Medicine (MFM)  department here at [facility]. It will not tell you the sex of the baby.  Call [phone] to see about coverage and any out of pocket costs regarding the Yjlreeozf29 (MT21) testing- also known as cell free DNA testing (cfDNA). This can be done as early as 9 weeks in most individuals and is blood work only. We recommend waiting until 10 weeks to ensure the most accuracy. This test checks for any chromosomal abnormalities like Down Syndrome. You can also find out the sex of the baby if you choose to know. Once you find out coverage and decide to proceed, send either myself or your doctor a message and we can see what date you can do it. It is done at our second floor lab at [facility].

## 2024-01-12 NOTE — PROGRESS NOTES
CC: Positive Pregnancy Test    HISTORY OF PRESENT ILLNESS:    Norma Manjarrez is a 33 y.o. female, ,  Presents today for a routine exam complaining of amenorrhea and positive home urine pregnancy test.  Patient's last menstrual period was 2023 (exact date).  First pregnancy. Saw Dr Ortiz for an initial fertility work-up, this was a spontaneous pregnancy. Here with her , [place]. Lots of nausea with vomiting, desires antiemetics. Taking effexor for anxiety and a prenatal vitamin. No bleeidng, some mild cramping. Dating scan completed. Desires aneuploidy screening. Works [facility] [email or web address].    Pregnancy   =========   [place] pregnancy. Number of embryos: 1     Dating   ======   LMP on: 2023   GA by LMP 9 w + 1 d   HERB by LMP: 8/15/2024   Ultrasound examination on: 2024   GA by U/S based upon: CRL   GA by U/S 9 w + 1 d   HERB by U/S: 8/15/2024   Assigned: based on ultrasound (CRL), selected on 2024   Assigned GA 9 w + 1 d   Assigned HERB: 8/15/2024     Assessment   ==========   Gestational sac: visualized   Location: intrauterine   Yolk sac: visualized   Amniotic sac: visualized   Embryo: visualized   CRL 24.6 mm 9w 1d [place]   Cardiac activity: present    bpm     ROS:  GENERAL: No weight changes. No swelling. + fatigue. No fever. Insomnia, nausea and vomiting  CARDIOVASCULAR: No chest pain. No shortness of breath. No leg cramps.   NEUROLOGICAL: No headaches. No vision changes.  BREASTS: No pain. No lumps. No discharge.  ABDOMEN: No pain. No diarrhea. + constipation.  REPRODUCTIVE: No abnormal bleeding.   VULVA: No pain. No lesions. No itching.  VAGINA: No relaxation. No itching. No odor. No discharge. No lesions.  URINARY: No incontinence. No nocturia. No frequency. No dysuria.    MEDICATIONS AND ALLERGIES:  Reviewed    COMPREHENSIVE GYN HISTORY:  PAP History: Denies abnormal Paps.  Infection History: Denies STDs. Denies PID.  Benign History: Denies uterine  fibroids. Denies ovarian cysts. Denies endometriosis. Denies other conditions.  Cancer History: Denies cervical cancer. Denies uterine cancer or hyperplasia. Denies ovarian cancer. Denies vulvar cancer or pre-cancer. Denies vaginal cancer or pre-cancer. Denies breast cancer. Denies colon cancer.  Sexual Activity History: Reports currently being sexually active  Menstrual History: None.  Contraception: None    /81   Wt 73.2 kg (161 lb 6 oz)   LMP 2023 (Exact Date)   BMI 23.16 kg/m²     PE:  AFFECT: Calm, alert and oriented X 3. Interactive during exam  GENERAL: Appears well-nourished, well-developed, in no acute distress.  HEAD: Normocephalic, atruamatic  TEETH: Good dentition.  THYROID: No thyromegally   BREASTS: No masses, skin changes, nipple discharge or adenopathy bilaterally.  SKIN: Normal for race, warm, & dry. No lesions or rashes.  LUNGS: Easy and unlabored, clear to auscultation bilaterally.  HEART: Regular rate and rhythm   ABDOMEN: Soft and nontender without masses or organomegally.  VULVA: No lesions, masses or tenderness.  VAGINA: Moist and well rugated without lesions or discharge.  CERVIX: Moist and pink without lesions, discharge or tenderness.      UTERUS SIZE: 8 week size, nontender and without masses.  ADNEXA: No masses or tenderness.  ESTIMATE OF PELVIC CAPACITY: Adequate  EXTREMITIES: No cyanosis, clubbing or edema. No calf tenderness.  LYMPH NODES: No axillary or inguinal adenopathy.    PROCEDURES:  UPT Positive  Genprobe  Pap    ASSESSMENT/PLAN:  Amenorrhea  Positive urine pregnancy test   -  Routine prenatal care    Nausea and vomiting in pregnancy    -  Education regarding lifestyle and dietary modifications    -  Advised use of B6/Unisom. Pt will notify us if no relief/worsening symptoms, will consider Zofran if needed.    1st TRIMESTER COUNSELING: Discussed all, booklet provided:  Common complaints of pregnancy  HIV and other routine prenatal tests including   genetic screening  Risk factors identified by prenatal history  Oriented to practice - discussed anticipated course of prenatal care & indications for Ultrasound  Childbirth classes/Hospital facilities   Nutrition and weight gain counseling  Toxoplasmosis precautions (Cats/Raw Meat)  Sexual activity and exercise  Environmental/Work hazards  Travel  Tobacco (Ask, Advise, Assess, Assist, and Arrange), as well as alcohol and drug use  Use of any medications (Including supplements, Vitamins, Herbs, or OTC Drugs)  Domestic violence  Seat belt use    TERATOLOGY COUNSELING:   Discussed indications and options for aneuploidy screening - pamphlets given    -  Pt desires mt21    FOLLOW-UP in 4 weeks with Dr. Ag  IOB labs today, mt21 next week  Pap updated  GC and urine cx pending  Rx phenergan and zofran    Lila He NP    OB/GYN

## 2024-01-13 LAB — BACTERIA UR CULT: NORMAL

## 2024-01-13 NOTE — Clinical Note
Alberto Alberts is a 34 year old female.  HPI:   See answers to questions above.     Current Outpatient Medications   Medication Sig Dispense Refill    triamcinolone 0.1 % External Cream Apply 1 Application topically 2 (two) times daily. APPLY TO AFFECTED AREA      progesterone 200 MG Oral Cap Take 1 capsule (200 mg total) by mouth nightly. TAKE AT BEDTIME      gabapentin 100 MG Oral Cap Take 1 capsule (100 mg total) by mouth.      valACYclovir 1 G Oral Tab Take 2 tablets (2,000 mg) po 12 hours apart as needed for cold sores 4 tablet 0    Phentermine HCl 37.5 MG Oral Cap       topiramate 50 MG Oral Tab Take 1 tablet (50 mg total) by mouth daily. 30 tablet 5    Multiple Vitamin Oral Tab Take 1 tablet by mouth daily.      Levothyroxine Sodium 175 MCG Oral Tab One tablet Monday through Friday, two tablets Saturdays and Sundays 102 tablet 3      Past Medical History:   Diagnosis Date    Anemia     Anesthesia complication     Bilateral chronic vestibular neuritis     Concussion 10/11/2020    H/O complications due to general anesthesia     pt reports she had a hard time waking up from foot surgery    Left foot pain     Migraines     Obesity, unspecified     Panic     PCOS (polycystic ovarian syndrome)     Postsurgical hypothyroidism     In 2015: total thyroidectomy for benign goiter    POTS (postural orthostatic tachycardia syndrome)     Tachycardia     Thyroid nodule 12/15/2014    Vestibular neuronitis 6/3/2016    Overview:  Last Assessment & Plan:  Since apparently her insurance company does not work on the weekend, we will wait for their eval on 6/13/16.  She has been cleared for dc to sn[facility] for rehab per all consultants      Past Surgical History:   Procedure Laterality Date    D & C  6/22/2011    D & C  2009    D & C  10/12/2012    DILATION/CURETTAGE,DIAGNOSTIC      HYSTEROSCOPY  6/22/2011    LAPAROSCOPY,DIAGNOSTIC      x3 r/t POS    LAPAROSCOPY,PELVIC,BIOPSY  2008    drill ovary    OTHER SURGICAL HISTORY Left     foot  Hi, fyi I changed billing level of service to well check for patient's age group, since the majority of visit was wellness. Let me know if any questions. Thank you, Colt Nassar  sx x3    OTHER SURGICAL HISTORY  03/2012    operative laparoscopy with electrocautery of multiple ovarian cysts bilaterally. it was lysis of a few pelvic adhesions    OTHER SURGICAL HISTORY      LAPAROSCOPY, OPERATIVE , POSSIBLE LAPAROTOMY WITH/WO TUBAL PERFUSION performed by Bal Benito MD at [facility] OR LOCATION    OTHER SURGICAL HISTORY  07/30/2018    Operative laparoscopy, endometrial sampling, Right peritubal cystectomy, Left ovarian cystectomy, fulgration of endometriosis    SURGICAL BARIATRICS - INTERNAL  10/23/2018    Gastric Sleeve at [place] by Dr. Cooper    THYROIDECTOMY Bilateral 01/15/2016      Family History   Problem Relation Age of Onset    Breast Cancer Mother 38    Cancer Mother         breast    Cancer Maternal Grandmother         Cervical    Thyroid disease Maternal Grandmother         Possible thyroid cancer    High Cholesterol Father     Hypertension Father     Heart Disease Father     Other (Other) Father         heart bypass surgery    Hypertension Paternal Grandfather     Diabetes Paternal Grandfather     Stroke Neg       Social History:  Social History     Socioeconomic History    Marital status:    Tobacco Use    Smoking status: Never    Smokeless tobacco: Never   Vaping Use    Vaping Use: Never used   Substance and Sexual Activity    Alcohol use: Yes     Alcohol/week: 0.0 standard drinks of alcohol     Comment: social    Drug use: No    Sexual activity: Yes     Partners: Male   Other Topics Concern    Caffeine Concern Yes     Comment: Daily; 1 cup coffee    Exercise No     Comment: 3 x a week 30  min     Seat Belt Yes   Social History Narrative    Single, lives at home with her father, no children, works at [employer]         ASSESSMENT AND PLAN:     Encounter Diagnosis   Name Primary?    Sore throat Yes       Advised in person care for exam and testing with sore throat, low grade fever and headache. Discussed locations and hours.    Meds & Refills for this Visit:  Requested  Prescriptions      No prescriptions requested or ordered in this encounter       Duration of  the service:  5 minutes    Patient advised to follow up with PCP if no improvement or worsening of symptoms  Refer to MyChart message for specific patient instructions

## 2024-01-14 LAB
C TRACH DNA SPEC QL NAA+PROBE: NOT DETECTED
N GONORRHOEA DNA SPEC QL NAA+PROBE: NOT DETECTED

## 2024-01-17 ENCOUNTER — PATIENT MESSAGE (OUTPATIENT)
Dept: OBSTETRICS AND GYNECOLOGY | Facility: CLINIC | Age: 34
End: 2024-01-17
Payer: COMMERCIAL

## 2024-01-18 LAB
FINAL PATHOLOGIC DIAGNOSIS: NORMAL
Lab: NORMAL

## 2024-01-29 ENCOUNTER — PATIENT MESSAGE (OUTPATIENT)
Dept: OBSTETRICS AND GYNECOLOGY | Facility: CLINIC | Age: 34
End: 2024-01-29
Payer: COMMERCIAL

## 2024-01-29 DIAGNOSIS — Z32.01 POSITIVE PREGNANCY TEST: Primary | ICD-10-CM

## 2024-01-29 DIAGNOSIS — O21.9 NAUSEA AND VOMITING DURING PREGNANCY: ICD-10-CM

## 2024-01-29 RX ORDER — PROMETHAZINE HYDROCHLORIDE 12.5 MG/1
12.5 TABLET ORAL EVERY 6 HOURS PRN
Qty: 30 TABLET | Refills: 1 | Status: SHIPPED | OUTPATIENT
Start: 2024-01-29

## 2024-02-23 ENCOUNTER — INITIAL PRENATAL (OUTPATIENT)
Dept: OBSTETRICS AND GYNECOLOGY | Facility: CLINIC | Age: 34
End: 2024-02-23
Payer: COMMERCIAL

## 2024-02-23 ENCOUNTER — PATIENT MESSAGE (OUTPATIENT)
Dept: ADMINISTRATIVE | Facility: OTHER | Age: 34
End: 2024-02-23
Payer: COMMERCIAL

## 2024-02-23 VITALS
SYSTOLIC BLOOD PRESSURE: 120 MMHG | DIASTOLIC BLOOD PRESSURE: 74 MMHG | BODY MASS INDEX: 23.22 KG/M2 | WEIGHT: 161.81 LBS

## 2024-02-23 DIAGNOSIS — O09.32 INITIAL OBSTETRIC VISIT IN SECOND TRIMESTER: Primary | ICD-10-CM

## 2024-02-23 DIAGNOSIS — Z3A.15 15 WEEKS GESTATION OF PREGNANCY: ICD-10-CM

## 2024-02-23 DIAGNOSIS — F41.0 GENERALIZED ANXIETY DISORDER WITH PANIC ATTACKS: ICD-10-CM

## 2024-02-23 DIAGNOSIS — O21.9 NAUSEA AND VOMITING DURING PREGNANCY: ICD-10-CM

## 2024-02-23 DIAGNOSIS — F41.1 GENERALIZED ANXIETY DISORDER WITH PANIC ATTACKS: ICD-10-CM

## 2024-02-23 PROCEDURE — 99999 PR PBB SHADOW E&M-EST. PATIENT-LVL III: CPT | Mod: PBBFAC,,, | Performed by: OBSTETRICS & GYNECOLOGY

## 2024-02-23 PROCEDURE — 0500F INITIAL PRENATAL CARE VISIT: CPT | Mod: CPTII,S$GLB,, | Performed by: OBSTETRICS & GYNECOLOGY

## 2024-02-23 NOTE — PROGRESS NOTES
@15w1d: INOB today, doing well. Denies vaginal bleeding or cramping, but some bilateral muscle cramping, worse at night. Recommend Tylenol and warm baths, increased PO hydration.  Anxiety - stable on Effexor.   Connected Mom order placed.   Discussed ASA 81 mg, amenable and will start taking.  Discussed vaccines, received Flu will consider COVID booster.  Anatomy scan ordered.   RTC 4 weeks OB f/u. SAB precautions reviewed.

## 2024-02-29 ENCOUNTER — PATIENT MESSAGE (OUTPATIENT)
Dept: OTHER | Facility: OTHER | Age: 34
End: 2024-02-29
Payer: COMMERCIAL

## 2024-03-07 ENCOUNTER — PATIENT MESSAGE (OUTPATIENT)
Dept: OTHER | Facility: OTHER | Age: 34
End: 2024-03-07
Payer: COMMERCIAL

## 2024-03-12 ENCOUNTER — PATIENT MESSAGE (OUTPATIENT)
Dept: OBSTETRICS AND GYNECOLOGY | Facility: CLINIC | Age: 34
End: 2024-03-12
Payer: COMMERCIAL

## 2024-03-22 ENCOUNTER — ROUTINE PRENATAL (OUTPATIENT)
Dept: OBSTETRICS AND GYNECOLOGY | Facility: CLINIC | Age: 34
End: 2024-03-22
Payer: COMMERCIAL

## 2024-03-22 VITALS
BODY MASS INDEX: 22.97 KG/M2 | DIASTOLIC BLOOD PRESSURE: 78 MMHG | WEIGHT: 160.06 LBS | SYSTOLIC BLOOD PRESSURE: 122 MMHG

## 2024-03-22 DIAGNOSIS — Z3A.19 19 WEEKS GESTATION OF PREGNANCY: ICD-10-CM

## 2024-03-22 DIAGNOSIS — F41.1 GENERALIZED ANXIETY DISORDER WITH PANIC ATTACKS: ICD-10-CM

## 2024-03-22 DIAGNOSIS — F41.0 GENERALIZED ANXIETY DISORDER WITH PANIC ATTACKS: ICD-10-CM

## 2024-03-22 DIAGNOSIS — Z34.02 ENCOUNTER FOR SUPERVISION OF NORMAL FIRST PREGNANCY IN SECOND TRIMESTER: Primary | ICD-10-CM

## 2024-03-22 PROCEDURE — 0502F SUBSEQUENT PRENATAL CARE: CPT | Mod: CPTII,S$GLB,, | Performed by: OBSTETRICS & GYNECOLOGY

## 2024-03-22 PROCEDURE — 99999 PR PBB SHADOW E&M-EST. PATIENT-LVL III: CPT | Mod: PBBFAC,,, | Performed by: OBSTETRICS & GYNECOLOGY

## 2024-03-22 NOTE — PROGRESS NOTES
@19w1d: Doing well, denies vaginal bleeding or cramping. Not yet feeling FM.   Anatomy scan scheduled.  Anxiety - stable on Effexor. Had to put dog to sleep yesterday and has been having emotional struggles. Forcing herself to eat. 1 lb weight loss since last pregnancy. Counseling provided. If appetite doesn't increase by next visit, can consider Remeron.   Girl!!  Continue PNV and ASA 81 mg.   Glucose and CBC with next visit.  RTC 5 weeks OB f/u. SAB precautions reviewed.

## 2024-03-27 ENCOUNTER — PROCEDURE VISIT (OUTPATIENT)
Dept: MATERNAL FETAL MEDICINE | Facility: CLINIC | Age: 34
End: 2024-03-27
Payer: COMMERCIAL

## 2024-03-27 DIAGNOSIS — Z36.2 ENCOUNTER FOR FOLLOW-UP ULTRASOUND OF FETAL ANATOMY: Primary | ICD-10-CM

## 2024-03-27 DIAGNOSIS — O09.32 INITIAL OBSTETRIC VISIT IN SECOND TRIMESTER: ICD-10-CM

## 2024-03-27 PROCEDURE — 76805 OB US >/= 14 WKS SNGL FETUS: CPT | Mod: S$GLB,,, | Performed by: OBSTETRICS & GYNECOLOGY

## 2024-03-28 ENCOUNTER — PATIENT MESSAGE (OUTPATIENT)
Dept: OTHER | Facility: OTHER | Age: 34
End: 2024-03-28
Payer: COMMERCIAL

## 2024-04-25 ENCOUNTER — PATIENT MESSAGE (OUTPATIENT)
Dept: OTHER | Facility: OTHER | Age: 34
End: 2024-04-25
Payer: COMMERCIAL

## 2024-04-26 ENCOUNTER — LAB VISIT (OUTPATIENT)
Dept: LAB | Facility: OTHER | Age: 34
End: 2024-04-26
Attending: OBSTETRICS & GYNECOLOGY
Payer: COMMERCIAL

## 2024-04-26 ENCOUNTER — ROUTINE PRENATAL (OUTPATIENT)
Dept: OBSTETRICS AND GYNECOLOGY | Facility: CLINIC | Age: 34
End: 2024-04-26
Payer: COMMERCIAL

## 2024-04-26 VITALS
WEIGHT: 173.06 LBS | BODY MASS INDEX: 24.83 KG/M2 | HEART RATE: 88 BPM | DIASTOLIC BLOOD PRESSURE: 88 MMHG | SYSTOLIC BLOOD PRESSURE: 113 MMHG

## 2024-04-26 DIAGNOSIS — Z23 NEED FOR TDAP VACCINATION: ICD-10-CM

## 2024-04-26 DIAGNOSIS — Z34.02 ENCOUNTER FOR SUPERVISION OF NORMAL FIRST PREGNANCY IN SECOND TRIMESTER: ICD-10-CM

## 2024-04-26 DIAGNOSIS — F41.1 GENERALIZED ANXIETY DISORDER WITH PANIC ATTACKS: ICD-10-CM

## 2024-04-26 DIAGNOSIS — F41.0 GENERALIZED ANXIETY DISORDER WITH PANIC ATTACKS: ICD-10-CM

## 2024-04-26 DIAGNOSIS — O21.9 NAUSEA AND VOMITING DURING PREGNANCY: ICD-10-CM

## 2024-04-26 DIAGNOSIS — Z34.02 ENCOUNTER FOR SUPERVISION OF NORMAL FIRST PREGNANCY IN SECOND TRIMESTER: Primary | ICD-10-CM

## 2024-04-26 DIAGNOSIS — Z3A.24 24 WEEKS GESTATION OF PREGNANCY: ICD-10-CM

## 2024-04-26 LAB
BASOPHILS # BLD AUTO: 0.03 K/UL (ref 0–0.2)
BASOPHILS NFR BLD: 0.3 % (ref 0–1.9)
DIFFERENTIAL METHOD BLD: ABNORMAL
EOSINOPHIL # BLD AUTO: 0.1 K/UL (ref 0–0.5)
EOSINOPHIL NFR BLD: 0.6 % (ref 0–8)
ERYTHROCYTE [DISTWIDTH] IN BLOOD BY AUTOMATED COUNT: 12.4 % (ref 11.5–14.5)
GLUCOSE SERPL-MCNC: 72 MG/DL (ref 70–140)
HCT VFR BLD AUTO: 35.2 % (ref 37–48.5)
HGB BLD-MCNC: 11.7 G/DL (ref 12–16)
IMM GRANULOCYTES # BLD AUTO: 0.07 K/UL (ref 0–0.04)
IMM GRANULOCYTES NFR BLD AUTO: 0.8 % (ref 0–0.5)
LYMPHOCYTES # BLD AUTO: 1.4 K/UL (ref 1–4.8)
LYMPHOCYTES NFR BLD: 15.6 % (ref 18–48)
MCH RBC QN AUTO: 31 PG (ref 27–31)
MCHC RBC AUTO-ENTMCNC: 33.2 G/DL (ref 32–36)
MCV RBC AUTO: 93 FL (ref 82–98)
MONOCYTES # BLD AUTO: 0.5 K/UL (ref 0.3–1)
MONOCYTES NFR BLD: 5.9 % (ref 4–15)
NEUTROPHILS # BLD AUTO: 6.6 K/UL (ref 1.8–7.7)
NEUTROPHILS NFR BLD: 76.8 % (ref 38–73)
NRBC BLD-RTO: 0 /100 WBC
PLATELET # BLD AUTO: 143 K/UL (ref 150–450)
PMV BLD AUTO: 11.7 FL (ref 9.2–12.9)
RBC # BLD AUTO: 3.78 M/UL (ref 4–5.4)
WBC # BLD AUTO: 8.64 K/UL (ref 3.9–12.7)

## 2024-04-26 PROCEDURE — 0502F SUBSEQUENT PRENATAL CARE: CPT | Mod: CPTII,S$GLB,, | Performed by: OBSTETRICS & GYNECOLOGY

## 2024-04-26 PROCEDURE — 85025 COMPLETE CBC W/AUTO DIFF WBC: CPT | Performed by: OBSTETRICS & GYNECOLOGY

## 2024-04-26 PROCEDURE — 82950 GLUCOSE TEST: CPT | Performed by: OBSTETRICS & GYNECOLOGY

## 2024-04-26 PROCEDURE — 36415 COLL VENOUS BLD VENIPUNCTURE: CPT | Performed by: OBSTETRICS & GYNECOLOGY

## 2024-04-26 PROCEDURE — 99999 PR PBB SHADOW E&M-EST. PATIENT-LVL III: CPT | Mod: PBBFAC,,, | Performed by: OBSTETRICS & GYNECOLOGY

## 2024-04-26 NOTE — PROGRESS NOTES
@24w1d: Doing well, denies CTX, LOF, VB. +FM.   Constipation - discussed stool softeners and Miralax.   Tdap discussed, will schedule with next visit.   Anxiety - stable on Effexor.   Glucose and CBC today.  Patient aware I will be out for her next visit.   RTC 4 weeks OB f/u. PTL/PPROM/PreE/FM precautions reviewed.

## 2024-05-01 ENCOUNTER — PROCEDURE VISIT (OUTPATIENT)
Dept: MATERNAL FETAL MEDICINE | Facility: CLINIC | Age: 34
End: 2024-05-01
Payer: COMMERCIAL

## 2024-05-01 ENCOUNTER — PATIENT MESSAGE (OUTPATIENT)
Dept: OBSTETRICS AND GYNECOLOGY | Facility: CLINIC | Age: 34
End: 2024-05-01
Payer: COMMERCIAL

## 2024-05-01 DIAGNOSIS — Z36.2 ENCOUNTER FOR FOLLOW-UP ULTRASOUND OF FETAL ANATOMY: ICD-10-CM

## 2024-05-01 PROCEDURE — 76816 OB US FOLLOW-UP PER FETUS: CPT | Mod: S$GLB,,, | Performed by: OBSTETRICS & GYNECOLOGY

## 2024-05-09 ENCOUNTER — PATIENT MESSAGE (OUTPATIENT)
Dept: OTHER | Facility: OTHER | Age: 34
End: 2024-05-09
Payer: COMMERCIAL

## 2024-05-20 ENCOUNTER — CLINICAL SUPPORT (OUTPATIENT)
Dept: OBSTETRICS AND GYNECOLOGY | Facility: CLINIC | Age: 34
End: 2024-05-20
Payer: COMMERCIAL

## 2024-05-20 ENCOUNTER — ROUTINE PRENATAL (OUTPATIENT)
Dept: OBSTETRICS AND GYNECOLOGY | Facility: CLINIC | Age: 34
End: 2024-05-20
Payer: COMMERCIAL

## 2024-05-20 VITALS — DIASTOLIC BLOOD PRESSURE: 64 MMHG | SYSTOLIC BLOOD PRESSURE: 102 MMHG | BODY MASS INDEX: 25.4 KG/M2 | WEIGHT: 177 LBS

## 2024-05-20 DIAGNOSIS — Z23 NEED FOR TDAP VACCINATION: ICD-10-CM

## 2024-05-20 DIAGNOSIS — Z3A.27 27 WEEKS GESTATION OF PREGNANCY: Primary | ICD-10-CM

## 2024-05-20 PROCEDURE — 0502F SUBSEQUENT PRENATAL CARE: CPT | Mod: CPTII,S$GLB,,

## 2024-05-20 PROCEDURE — 90715 TDAP VACCINE 7 YRS/> IM: CPT | Mod: S$GLB,,, | Performed by: OBSTETRICS & GYNECOLOGY

## 2024-05-20 PROCEDURE — 90471 IMMUNIZATION ADMIN: CPT | Mod: S$GLB,,, | Performed by: OBSTETRICS & GYNECOLOGY

## 2024-05-20 PROCEDURE — 99999 PR PBB SHADOW E&M-EST. PATIENT-LVL I: CPT | Mod: PBBFAC,,,

## 2024-05-20 PROCEDURE — 99999 PR PBB SHADOW E&M-EST. PATIENT-LVL III: CPT | Mod: PBBFAC,,,

## 2024-05-20 RX ORDER — PYRIDOXINE HCL (VITAMIN B6) 25 MG
25 TABLET ORAL DAILY
COMMUNITY

## 2024-05-20 RX ORDER — LANOLIN ALCOHOL/MO/W.PET/CERES
400 CREAM (GRAM) TOPICAL DAILY
COMMUNITY

## 2024-05-20 RX ORDER — FERROUS GLUCONATE 324(38)MG
324 TABLET ORAL
COMMUNITY

## 2024-05-20 NOTE — PROGRESS NOTES
Here for routine OB appt at 27w4d, with no complaints.  Reports good FM.  Denies LOF, denies VB, denies contractions. Getting Tdap today. O+. Discussed pediatricians- handout given. Discussed prenatal classes.   Reviewed warning signs of Labor and Preeclampsia.  Daily FM counts reinforced.  F/U scheduled 2 weeks with Dr. Ag

## 2024-05-23 ENCOUNTER — PATIENT MESSAGE (OUTPATIENT)
Dept: OTHER | Facility: OTHER | Age: 34
End: 2024-05-23
Payer: COMMERCIAL

## 2024-06-03 ENCOUNTER — ROUTINE PRENATAL (OUTPATIENT)
Dept: OBSTETRICS AND GYNECOLOGY | Facility: CLINIC | Age: 34
End: 2024-06-03
Payer: COMMERCIAL

## 2024-06-03 VITALS — BODY MASS INDEX: 25.97 KG/M2 | WEIGHT: 181 LBS | SYSTOLIC BLOOD PRESSURE: 120 MMHG | DIASTOLIC BLOOD PRESSURE: 74 MMHG

## 2024-06-03 DIAGNOSIS — O99.013 ANEMIA DURING PREGNANCY IN THIRD TRIMESTER: ICD-10-CM

## 2024-06-03 DIAGNOSIS — Z34.03 ENCOUNTER FOR SUPERVISION OF NORMAL FIRST PREGNANCY IN THIRD TRIMESTER: Primary | ICD-10-CM

## 2024-06-03 DIAGNOSIS — F41.1 GENERALIZED ANXIETY DISORDER WITH PANIC ATTACKS: ICD-10-CM

## 2024-06-03 DIAGNOSIS — F41.0 GENERALIZED ANXIETY DISORDER WITH PANIC ATTACKS: ICD-10-CM

## 2024-06-03 DIAGNOSIS — Z3A.29 29 WEEKS GESTATION OF PREGNANCY: ICD-10-CM

## 2024-06-03 PROCEDURE — 99999 PR PBB SHADOW E&M-EST. PATIENT-LVL III: CPT | Mod: PBBFAC,,, | Performed by: OBSTETRICS & GYNECOLOGY

## 2024-06-03 PROCEDURE — 0502F SUBSEQUENT PRENATAL CARE: CPT | Mod: CPTII,S$GLB,, | Performed by: OBSTETRICS & GYNECOLOGY

## 2024-06-03 NOTE — PROGRESS NOTES
@29w4d: Doing well, denies CTX, LOF, VB. +FM.   Anxiety - meeting with psychiatrist next week to review medications and talk about postpartum strategies.   Interested in breast feeding, ordered a pump.  Started FMLA paperwork, will send electronic copy.  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   Tdap done.  Peds list given.   Pregnancy class list given.   RTC 2 weeks OB f/u. PTL/PPROM/PreE/FM precautions reviewed.

## 2024-06-04 ENCOUNTER — PATIENT MESSAGE (OUTPATIENT)
Dept: OBSTETRICS AND GYNECOLOGY | Facility: CLINIC | Age: 34
End: 2024-06-04
Payer: COMMERCIAL

## 2024-06-06 ENCOUNTER — PATIENT MESSAGE (OUTPATIENT)
Dept: OTHER | Facility: OTHER | Age: 34
End: 2024-06-06
Payer: COMMERCIAL

## 2024-06-06 ENCOUNTER — PATIENT MESSAGE (OUTPATIENT)
Dept: OBSTETRICS AND GYNECOLOGY | Facility: CLINIC | Age: 34
End: 2024-06-06
Payer: COMMERCIAL

## 2024-06-07 ENCOUNTER — TELEPHONE (OUTPATIENT)
Dept: OBSTETRICS AND GYNECOLOGY | Facility: CLINIC | Age: 34
End: 2024-06-07
Payer: COMMERCIAL

## 2024-06-07 NOTE — TELEPHONE ENCOUNTER
Called pt in regards to her 's [employer] test. Pt provided her barcode from her [facility] testing ([id number]). Told pt to look out for the saliva kit via mail. Pt verbally expressed understanding.     I finished filling out her husabnd's ppw and faxed it over.

## 2024-06-10 ENCOUNTER — PATIENT MESSAGE (OUTPATIENT)
Dept: INTERNAL MEDICINE | Facility: CLINIC | Age: 34
End: 2024-06-10
Payer: COMMERCIAL

## 2024-06-13 ENCOUNTER — TELEPHONE (OUTPATIENT)
Dept: OBSTETRICS AND GYNECOLOGY | Facility: CLINIC | Age: 34
End: 2024-06-13
Payer: COMMERCIAL

## 2024-06-13 NOTE — TELEPHONE ENCOUNTER
Called pt. She stated that she received a voicemail about r/s an appt. I went to her note and didn't see which appt. Told her we would call her back and apologized for the confusion. Pt was okay with us calling her back later and verbalized understanding.  Messaged Tavia Diaz (from telephone note) to say she called back and wasn't sure what to r/s

## 2024-06-13 NOTE — TELEPHONE ENCOUNTER
----- Message from Apoorva Thomson sent at 6/13/2024 12:28 PM CDT -----  Name of Who is calling :OSVALDO ARRIOLA [[id number]]        What is the request in detail:Pt would like a angie back .please assist         Can the clinic reply by [facility]:  no          What [facility] back [facility]: [phone]

## 2024-06-17 ENCOUNTER — ROUTINE PRENATAL (OUTPATIENT)
Dept: OBSTETRICS AND GYNECOLOGY | Facility: CLINIC | Age: 34
End: 2024-06-17
Payer: COMMERCIAL

## 2024-06-17 VITALS
DIASTOLIC BLOOD PRESSURE: 74 MMHG | BODY MASS INDEX: 26.32 KG/M2 | SYSTOLIC BLOOD PRESSURE: 108 MMHG | WEIGHT: 183.44 LBS

## 2024-06-17 DIAGNOSIS — O99.013 ANEMIA DURING PREGNANCY IN THIRD TRIMESTER: ICD-10-CM

## 2024-06-17 DIAGNOSIS — Z34.03 ENCOUNTER FOR SUPERVISION OF NORMAL FIRST PREGNANCY IN THIRD TRIMESTER: Primary | ICD-10-CM

## 2024-06-17 DIAGNOSIS — O26.843 UTERINE SIZE-DATE DISCREPANCY IN THIRD TRIMESTER: ICD-10-CM

## 2024-06-17 DIAGNOSIS — F41.0 GENERALIZED ANXIETY DISORDER WITH PANIC ATTACKS: ICD-10-CM

## 2024-06-17 DIAGNOSIS — F41.1 GENERALIZED ANXIETY DISORDER WITH PANIC ATTACKS: ICD-10-CM

## 2024-06-17 DIAGNOSIS — Z3A.31 31 WEEKS GESTATION OF PREGNANCY: ICD-10-CM

## 2024-06-17 PROCEDURE — 0502F SUBSEQUENT PRENATAL CARE: CPT | Mod: CPTII,S$GLB,, | Performed by: OBSTETRICS & GYNECOLOGY

## 2024-06-17 PROCEDURE — 99999 PR PBB SHADOW E&M-EST. PATIENT-LVL III: CPT | Mod: PBBFAC,,, | Performed by: OBSTETRICS & GYNECOLOGY

## 2024-06-17 RX ORDER — NAPROXEN SODIUM 220 MG/1
81 TABLET, FILM COATED ORAL DAILY
COMMUNITY
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
## 2024-06-18 ENCOUNTER — OFFICE VISIT (OUTPATIENT)
Dept: OTOLARYNGOLOGY | Facility: CLINIC | Age: 34
End: 2024-06-18
Payer: COMMERCIAL

## 2024-06-18 ENCOUNTER — CLINICAL SUPPORT (OUTPATIENT)
Dept: AUDIOLOGY | Facility: CLINIC | Age: 34
End: 2024-06-18
Payer: COMMERCIAL

## 2024-06-18 DIAGNOSIS — Z01.10 NORMAL HEARING EXAM: ICD-10-CM

## 2024-06-18 DIAGNOSIS — H93.8X3 EAR FULLNESS, BILATERAL: Primary | ICD-10-CM

## 2024-06-18 DIAGNOSIS — H93.293 ABNORMAL AUDITORY PERCEPTION OF BOTH EARS: Primary | ICD-10-CM

## 2024-06-18 PROCEDURE — 92552 PURE TONE AUDIOMETRY AIR: CPT | Mod: S$GLB,,, | Performed by: AUDIOLOGIST

## 2024-06-18 PROCEDURE — 99204 OFFICE O/P NEW MOD 45 MIN: CPT | Mod: S$GLB,,, | Performed by: NURSE PRACTITIONER

## 2024-06-18 PROCEDURE — 1159F MED LIST DOCD IN RCRD: CPT | Mod: CPTII,S$GLB,, | Performed by: NURSE PRACTITIONER

## 2024-06-18 PROCEDURE — 99999 PR PBB SHADOW E&M-EST. PATIENT-LVL I: CPT | Mod: PBBFAC,,, | Performed by: AUDIOLOGIST

## 2024-06-18 PROCEDURE — 99999 PR PBB SHADOW E&M-EST. PATIENT-LVL III: CPT | Mod: PBBFAC,,, | Performed by: NURSE PRACTITIONER

## 2024-06-18 PROCEDURE — 92556 SPEECH AUDIOMETRY COMPLETE: CPT | Mod: S$GLB,,, | Performed by: AUDIOLOGIST

## 2024-06-18 PROCEDURE — 92567 TYMPANOMETRY: CPT | Mod: S$GLB,,, | Performed by: AUDIOLOGIST

## 2024-06-18 NOTE — PROGRESS NOTES
Norma Martha Manjarrez was seen in the clinic today for an audiological evaluation.  Martha Manjarrez reported muffled hearing.    Audiological testing revealed normal hearing sensitivity for the right ear and normal hearing sensitivity for the left ear.  A speech reception threshold was obtained at 5 dBHL for the right ear and at 5 dBHL for the left ear.  Speech discrimination was 100% for the right ear and 100% for the left ear.      Tympanometry testing revealed a Type A tympanogram for the right ear and a Type A tympanogram for the left ear.      Recommendations:  1. Otologic evaluation  2. Audiological evaluation, as needed  3. Hearing protection when in noise

## 2024-06-18 NOTE — PROGRESS NOTES
"Subjective:   Norma Manjarrez is a 33 y.o. female who was self-referred for  "an ear blockage" . She has a past medical history of Anxiety and Left knee pain. She is 32 weeks pregnant. She reports bilateral ear fullness/"clogged" sensation with muffled hearing since about May. She denies any otalgia, otorrhea, tinnitus or vertigo. She reports some nasal congestion, but denies sinus pressure or nasal drainage. There is not a family history of hearing loss at a young age. There is a prior history of ear surgery- PE tubes as a child. There is not a prior history of ear infections. There is not a history of ear trauma. She denies a history of significant noise exposure.     Past Medical History  She has a past medical history of Anxiety and Left knee pain.    Past Surgical History  She has a past surgical history that includes Hand surgery (Right, 2012) and Hand surgery (Left, 2008).    Family History  Her family history includes Diabetes in her father; Lung cancer in her maternal grandfather; No Known Problems in her brother, maternal grandmother, mother, and sister.    Social History  She reports that she has never smoked. She has never used smokeless tobacco. She reports that she does not currently use alcohol after a past usage of about 4.0 standard drinks of alcohol per week. She reports that she does not use drugs.    Allergies  She has No Known Allergies.    Medications  She has a current medication list which includes the following prescription(s): aspirin, diphenhydramine hcl, ferrous gluconate, magnesium oxide, ondansetron, pnv no.95/ferrous fum/folic ac, promethazine, promethazine, pyridoxine (vitamin b6), and venlafaxine.  Review of Systems     Constitutional: Negative for chills and fever.      HENT: Positive for hearing loss, nosebleeds and stuffy nose.  Negative for ear discharge, ear infection, ear pain, ringing in the ears, runny nose and sinus pressure.      Neurological: Negative for " dizziness and light-headedness.          Objective:     Constitutional:   She is oriented to person, place, and time. She appears well-developed and well-nourished. She appears alert. She is cooperative.  Non-toxic appearance. She does not have a sickly appearance. She does not appear ill. Normal speech.      Head:  Normocephalic and atraumatic. Not macrocephalic and not microcephalic. Head is without abrasion, without right periorbital erythema, without left periorbital erythema and without TMJ tenderness.     Ears:    Right Ear: No drainage, swelling or tenderness. No mastoid tenderness. Tympanic membrane is not scarred, not perforated, not erythematous, not retracted and not bulging. No middle ear effusion.   Left Ear: No drainage, swelling or tenderness. No mastoid tenderness. Tympanic membrane is not scarred, not perforated, not erythematous, not retracted and not bulging.  No middle ear effusion.     Nose:  Nose lacerations (small healing laceration to left septum wall) present. No mucosal edema, rhinorrhea, septal deviation or polyps.  No foreign bodies. Turbinates normal.      Pulmonary/Chest:   Effort normal.     Psychiatric:   She has a normal mood and affect. Her speech is normal and behavior is normal.     Neurological:   She is alert and oriented to person, place, and time.     Procedure  None    Imaging  No pertinent imaging available.  Audiogram    I independently reviewed the tracings of the complete audiometric evaluation. I reviewed the audiogram with the patient as well. Pertinent findings include a normal study.  Assessment:     1. Ear fullness, bilateral    2. Normal hearing exam      Plan:   Diagnoses and all orders for this visit:    Ear fullness, bilateral  Otoscopic exam benign- no retractions, cerumen, infection or fluid noted. Discussed the anatomy and function of the eustachian tube including aerating and draining the middle ear. Possibly related to rhinitis of pregnancy? She was offered  Rhinocort aqua nasal spray. Declined at this time, but will reach out via portal if she changes her mind. Can also use saline nasal spray as alterative (this will also help with nosebleeds). Will monitor symptoms, if no improvement after delivery would try topical nasal steroid before following up.    Normal hearing exam  Reviewed patient's audiometric testing interpretation which is consistent with normal hearing bilaterally. Hearing conservation strongly recommended when in noisy environments.     Patient encouraged to return to clinic PRN.

## 2024-06-19 ENCOUNTER — PROCEDURE VISIT (OUTPATIENT)
Dept: MATERNAL FETAL MEDICINE | Facility: CLINIC | Age: 34
End: 2024-06-19
Payer: COMMERCIAL

## 2024-06-19 DIAGNOSIS — O26.843 UTERINE SIZE-DATE DISCREPANCY IN THIRD TRIMESTER: ICD-10-CM

## 2024-06-19 PROCEDURE — 76816 OB US FOLLOW-UP PER FETUS: CPT | Mod: S$GLB,,, | Performed by: OBSTETRICS & GYNECOLOGY

## 2024-06-20 ENCOUNTER — PATIENT MESSAGE (OUTPATIENT)
Dept: OTHER | Facility: OTHER | Age: 34
End: 2024-06-20
Payer: COMMERCIAL

## 2024-07-01 ENCOUNTER — ROUTINE PRENATAL (OUTPATIENT)
Dept: OBSTETRICS AND GYNECOLOGY | Facility: CLINIC | Age: 34
End: 2024-07-01
Payer: COMMERCIAL

## 2024-07-01 ENCOUNTER — LAB VISIT (OUTPATIENT)
Dept: LAB | Facility: OTHER | Age: 34
End: 2024-07-01
Attending: OBSTETRICS & GYNECOLOGY
Payer: COMMERCIAL

## 2024-07-01 ENCOUNTER — PATIENT MESSAGE (OUTPATIENT)
Dept: OBSTETRICS AND GYNECOLOGY | Facility: CLINIC | Age: 34
End: 2024-07-01

## 2024-07-01 VITALS
BODY MASS INDEX: 26.57 KG/M2 | SYSTOLIC BLOOD PRESSURE: 110 MMHG | WEIGHT: 185.19 LBS | DIASTOLIC BLOOD PRESSURE: 76 MMHG

## 2024-07-01 DIAGNOSIS — F41.0 GENERALIZED ANXIETY DISORDER WITH PANIC ATTACKS: ICD-10-CM

## 2024-07-01 DIAGNOSIS — Z3A.33 33 WEEKS GESTATION OF PREGNANCY: ICD-10-CM

## 2024-07-01 DIAGNOSIS — Z34.03 ENCOUNTER FOR SUPERVISION OF NORMAL FIRST PREGNANCY IN THIRD TRIMESTER: Primary | ICD-10-CM

## 2024-07-01 DIAGNOSIS — Z34.03 ENCOUNTER FOR SUPERVISION OF NORMAL FIRST PREGNANCY IN THIRD TRIMESTER: ICD-10-CM

## 2024-07-01 DIAGNOSIS — F41.1 GENERALIZED ANXIETY DISORDER WITH PANIC ATTACKS: ICD-10-CM

## 2024-07-01 DIAGNOSIS — O99.013 ANEMIA DURING PREGNANCY IN THIRD TRIMESTER: ICD-10-CM

## 2024-07-01 DIAGNOSIS — Z34.90 ENCOUNTER FOR ELECTIVE INDUCTION OF LABOR: ICD-10-CM

## 2024-07-01 LAB
BASOPHILS # BLD AUTO: 0.03 K/UL (ref 0–0.2)
BASOPHILS NFR BLD: 0.3 % (ref 0–1.9)
DIFFERENTIAL METHOD BLD: ABNORMAL
EOSINOPHIL # BLD AUTO: 0 K/UL (ref 0–0.5)
EOSINOPHIL NFR BLD: 0.4 % (ref 0–8)
ERYTHROCYTE [DISTWIDTH] IN BLOOD BY AUTOMATED COUNT: 12.8 % (ref 11.5–14.5)
HCT VFR BLD AUTO: 36.6 % (ref 37–48.5)
HGB BLD-MCNC: 12.6 G/DL (ref 12–16)
HIV 1+2 AB+HIV1 P24 AG SERPL QL IA: NEGATIVE
IMM GRANULOCYTES # BLD AUTO: 0.11 K/UL (ref 0–0.04)
IMM GRANULOCYTES NFR BLD AUTO: 1.2 % (ref 0–0.5)
LYMPHOCYTES # BLD AUTO: 1.4 K/UL (ref 1–4.8)
LYMPHOCYTES NFR BLD: 14.4 % (ref 18–48)
MCH RBC QN AUTO: 32.3 PG (ref 27–31)
MCHC RBC AUTO-ENTMCNC: 34.4 G/DL (ref 32–36)
MCV RBC AUTO: 94 FL (ref 82–98)
MONOCYTES # BLD AUTO: 0.7 K/UL (ref 0.3–1)
MONOCYTES NFR BLD: 7.5 % (ref 4–15)
NEUTROPHILS # BLD AUTO: 7.2 K/UL (ref 1.8–7.7)
NEUTROPHILS NFR BLD: 76.2 % (ref 38–73)
NRBC BLD-RTO: 0 /100 WBC
PLATELET # BLD AUTO: 106 K/UL (ref 150–450)
PMV BLD AUTO: 12.6 FL (ref 9.2–12.9)
RBC # BLD AUTO: 3.9 M/UL (ref 4–5.4)
TREPONEMA PALLIDUM IGG+IGM AB [PRESENCE] IN SERUM OR PLASMA BY IMMUNOASSAY: NONREACTIVE
WBC # BLD AUTO: 9.46 K/UL (ref 3.9–12.7)

## 2024-07-01 PROCEDURE — 36415 COLL VENOUS BLD VENIPUNCTURE: CPT | Performed by: OBSTETRICS & GYNECOLOGY

## 2024-07-01 PROCEDURE — 99999 PR PBB SHADOW E&M-EST. PATIENT-LVL II: CPT | Mod: PBBFAC,,, | Performed by: OBSTETRICS & GYNECOLOGY

## 2024-07-01 PROCEDURE — 86593 SYPHILIS TEST NON-TREP QUANT: CPT | Performed by: OBSTETRICS & GYNECOLOGY

## 2024-07-01 PROCEDURE — 85025 COMPLETE CBC W/AUTO DIFF WBC: CPT | Performed by: OBSTETRICS & GYNECOLOGY

## 2024-07-01 PROCEDURE — 0502F SUBSEQUENT PRENATAL CARE: CPT | Mod: CPTII,S$GLB,, | Performed by: OBSTETRICS & GYNECOLOGY

## 2024-07-01 PROCEDURE — 87389 HIV-1 AG W/HIV-1&-2 AB AG IA: CPT | Performed by: OBSTETRICS & GYNECOLOGY

## 2024-07-01 NOTE — PROGRESS NOTES
@33w4d: Doing well, denies CTX, LOF, VB. +FM.   3T labs today.  Packing list given. Doing prenatal classes.  Tdap, pump, peds done.   Pt aware I will be out of office for her next visit.   [place] carrier testing still pending.   Desires 39 week induction. Will place request for 8/9 @0000.   RTC 2 weeks OB f/u. PTL/PPROM/PreE/FM precautions reviewed.

## 2024-07-11 ENCOUNTER — PATIENT MESSAGE (OUTPATIENT)
Dept: OTHER | Facility: OTHER | Age: 34
End: 2024-07-11
Payer: COMMERCIAL

## 2024-07-18 ENCOUNTER — LAB VISIT (OUTPATIENT)
Dept: LAB | Facility: OTHER | Age: 34
End: 2024-07-18
Attending: NURSE PRACTITIONER
Payer: COMMERCIAL

## 2024-07-18 ENCOUNTER — ROUTINE PRENATAL (OUTPATIENT)
Dept: OBSTETRICS AND GYNECOLOGY | Facility: CLINIC | Age: 34
End: 2024-07-18
Payer: COMMERCIAL

## 2024-07-18 VITALS
WEIGHT: 188.94 LBS | BODY MASS INDEX: 27.11 KG/M2 | DIASTOLIC BLOOD PRESSURE: 81 MMHG | SYSTOLIC BLOOD PRESSURE: 121 MMHG

## 2024-07-18 DIAGNOSIS — Z3A.36 36 WEEKS GESTATION OF PREGNANCY: ICD-10-CM

## 2024-07-18 DIAGNOSIS — Z3A.36 36 WEEKS GESTATION OF PREGNANCY: Primary | ICD-10-CM

## 2024-07-18 LAB
BASOPHILS # BLD AUTO: 0.03 K/UL (ref 0–0.2)
BASOPHILS NFR BLD: 0.3 % (ref 0–1.9)
DIFFERENTIAL METHOD BLD: ABNORMAL
EOSINOPHIL # BLD AUTO: 0.1 K/UL (ref 0–0.5)
EOSINOPHIL NFR BLD: 0.7 % (ref 0–8)
ERYTHROCYTE [DISTWIDTH] IN BLOOD BY AUTOMATED COUNT: 12.9 % (ref 11.5–14.5)
HCT VFR BLD AUTO: 35.6 % (ref 37–48.5)
HGB BLD-MCNC: 12.1 G/DL (ref 12–16)
IMM GRANULOCYTES # BLD AUTO: 0.12 K/UL (ref 0–0.04)
IMM GRANULOCYTES NFR BLD AUTO: 1.2 % (ref 0–0.5)
LYMPHOCYTES # BLD AUTO: 1.6 K/UL (ref 1–4.8)
LYMPHOCYTES NFR BLD: 15.3 % (ref 18–48)
MCH RBC QN AUTO: 31.6 PG (ref 27–31)
MCHC RBC AUTO-ENTMCNC: 34 G/DL (ref 32–36)
MCV RBC AUTO: 93 FL (ref 82–98)
MONOCYTES # BLD AUTO: 0.9 K/UL (ref 0.3–1)
MONOCYTES NFR BLD: 8.4 % (ref 4–15)
NEUTROPHILS # BLD AUTO: 7.6 K/UL (ref 1.8–7.7)
NEUTROPHILS NFR BLD: 74.1 % (ref 38–73)
NRBC BLD-RTO: 0 /100 WBC
PLATELET # BLD AUTO: 95 K/UL (ref 150–450)
PMV BLD AUTO: 12.5 FL (ref 9.2–12.9)
RBC # BLD AUTO: 3.83 M/UL (ref 4–5.4)
WBC # BLD AUTO: 10.27 K/UL (ref 3.9–12.7)

## 2024-07-18 PROCEDURE — 85025 COMPLETE CBC W/AUTO DIFF WBC: CPT | Performed by: NURSE PRACTITIONER

## 2024-07-18 PROCEDURE — 99999 PR PBB SHADOW E&M-EST. PATIENT-LVL III: CPT | Mod: PBBFAC,,, | Performed by: NURSE PRACTITIONER

## 2024-07-18 PROCEDURE — 87653 STREP B DNA AMP PROBE: CPT | Performed by: NURSE PRACTITIONER

## 2024-07-18 PROCEDURE — 0502F SUBSEQUENT PRENATAL CARE: CPT | Mod: CPTII,S$GLB,, | Performed by: NURSE PRACTITIONER

## 2024-07-18 PROCEDURE — 36415 COLL VENOUS BLD VENIPUNCTURE: CPT | Performed by: NURSE PRACTITIONER

## 2024-07-18 NOTE — PROGRESS NOTES
Here for routine OB appt at 36w0d, with no complaints.  Reports good FM.  Denies LOF, denies VB, denies contractions.  Reviewed warning signs of Labor and Preeclampsia.  Daily FM counts reinforced.  Working from home, already submitted FMLA  Interested in infant CPR classes, discussed recs  Repeat CBC today for platelet count, last 106  GBS pending  RTC 1 week - declines skipping 37 week with conn mom

## 2024-07-19 ENCOUNTER — PATIENT MESSAGE (OUTPATIENT)
Dept: OBSTETRICS AND GYNECOLOGY | Facility: CLINIC | Age: 34
End: 2024-07-19
Payer: COMMERCIAL

## 2024-07-19 LAB — GROUP B STREPTOCOCCUS, PCR: NEGATIVE

## 2024-07-22 ENCOUNTER — ROUTINE PRENATAL (OUTPATIENT)
Dept: OBSTETRICS AND GYNECOLOGY | Facility: CLINIC | Age: 34
End: 2024-07-22
Payer: COMMERCIAL

## 2024-07-22 VITALS — BODY MASS INDEX: 26.76 KG/M2 | WEIGHT: 186.5 LBS | DIASTOLIC BLOOD PRESSURE: 82 MMHG | SYSTOLIC BLOOD PRESSURE: 126 MMHG

## 2024-07-22 DIAGNOSIS — F41.0 GENERALIZED ANXIETY DISORDER WITH PANIC ATTACKS: ICD-10-CM

## 2024-07-22 DIAGNOSIS — Z3A.36 36 WEEKS GESTATION OF PREGNANCY: ICD-10-CM

## 2024-07-22 DIAGNOSIS — O26.843 UTERINE SIZE-DATE DISCREPANCY IN THIRD TRIMESTER: ICD-10-CM

## 2024-07-22 DIAGNOSIS — O99.013 ANEMIA DURING PREGNANCY IN THIRD TRIMESTER: ICD-10-CM

## 2024-07-22 DIAGNOSIS — F41.1 GENERALIZED ANXIETY DISORDER WITH PANIC ATTACKS: ICD-10-CM

## 2024-07-22 DIAGNOSIS — D69.6 BENIGN GESTATIONAL THROMBOCYTOPENIA IN THIRD TRIMESTER: ICD-10-CM

## 2024-07-22 DIAGNOSIS — O99.113 BENIGN GESTATIONAL THROMBOCYTOPENIA IN THIRD TRIMESTER: ICD-10-CM

## 2024-07-22 DIAGNOSIS — Z34.03 ENCOUNTER FOR SUPERVISION OF NORMAL FIRST PREGNANCY IN THIRD TRIMESTER: Primary | ICD-10-CM

## 2024-07-22 PROCEDURE — 0502F SUBSEQUENT PRENATAL CARE: CPT | Mod: CPTII,S$GLB,, | Performed by: OBSTETRICS & GYNECOLOGY

## 2024-07-22 PROCEDURE — 99999 PR PBB SHADOW E&M-EST. PATIENT-LVL III: CPT | Mod: PBBFAC,,, | Performed by: OBSTETRICS & GYNECOLOGY

## 2024-07-22 NOTE — PROGRESS NOTES
@36w4d: Doing well, denies CTX, LOF, VB. +FM.   Increased pelvic and hip pain. Tylenol, warm baths, maternity belt recommended.  Consents today, and GBS negative.   GTP - platelets 95k. Will repeat labs next week and consult heme if continuing to drop.   Discuss PP contraception next visit.   RTC 1 week OB f/u. PTL/PPROM/PreE/FM precautions reviewed.

## 2024-07-29 ENCOUNTER — ROUTINE PRENATAL (OUTPATIENT)
Dept: OBSTETRICS AND GYNECOLOGY | Facility: CLINIC | Age: 34
End: 2024-07-29
Payer: COMMERCIAL

## 2024-07-29 ENCOUNTER — LAB VISIT (OUTPATIENT)
Dept: LAB | Facility: OTHER | Age: 34
End: 2024-07-29
Attending: OBSTETRICS & GYNECOLOGY
Payer: COMMERCIAL

## 2024-07-29 ENCOUNTER — PATIENT MESSAGE (OUTPATIENT)
Dept: OBSTETRICS AND GYNECOLOGY | Facility: CLINIC | Age: 34
End: 2024-07-29

## 2024-07-29 VITALS
DIASTOLIC BLOOD PRESSURE: 86 MMHG | SYSTOLIC BLOOD PRESSURE: 122 MMHG | BODY MASS INDEX: 26.86 KG/M2 | WEIGHT: 187.19 LBS

## 2024-07-29 DIAGNOSIS — F41.0 GENERALIZED ANXIETY DISORDER WITH PANIC ATTACKS: ICD-10-CM

## 2024-07-29 DIAGNOSIS — Z34.03 ENCOUNTER FOR SUPERVISION OF NORMAL FIRST PREGNANCY IN THIRD TRIMESTER: Primary | ICD-10-CM

## 2024-07-29 DIAGNOSIS — D69.6 BENIGN GESTATIONAL THROMBOCYTOPENIA IN THIRD TRIMESTER: ICD-10-CM

## 2024-07-29 DIAGNOSIS — O99.013 ANEMIA DURING PREGNANCY IN THIRD TRIMESTER: ICD-10-CM

## 2024-07-29 DIAGNOSIS — O99.113 BENIGN GESTATIONAL THROMBOCYTOPENIA IN THIRD TRIMESTER: ICD-10-CM

## 2024-07-29 DIAGNOSIS — Z3A.37 37 WEEKS GESTATION OF PREGNANCY: ICD-10-CM

## 2024-07-29 DIAGNOSIS — F41.1 GENERALIZED ANXIETY DISORDER WITH PANIC ATTACKS: ICD-10-CM

## 2024-07-29 LAB
BASOPHILS # BLD AUTO: 0.03 K/UL (ref 0–0.2)
BASOPHILS NFR BLD: 0.3 % (ref 0–1.9)
DIFFERENTIAL METHOD BLD: ABNORMAL
EOSINOPHIL # BLD AUTO: 0.1 K/UL (ref 0–0.5)
EOSINOPHIL NFR BLD: 0.6 % (ref 0–8)
ERYTHROCYTE [DISTWIDTH] IN BLOOD BY AUTOMATED COUNT: 12.8 % (ref 11.5–14.5)
HCT VFR BLD AUTO: 36.4 % (ref 37–48.5)
HGB BLD-MCNC: 12.6 G/DL (ref 12–16)
IMM GRANULOCYTES # BLD AUTO: 0.13 K/UL (ref 0–0.04)
IMM GRANULOCYTES NFR BLD AUTO: 1.3 % (ref 0–0.5)
LYMPHOCYTES # BLD AUTO: 1.6 K/UL (ref 1–4.8)
LYMPHOCYTES NFR BLD: 16 % (ref 18–48)
MCH RBC QN AUTO: 32.1 PG (ref 27–31)
MCHC RBC AUTO-ENTMCNC: 34.6 G/DL (ref 32–36)
MCV RBC AUTO: 93 FL (ref 82–98)
MONOCYTES # BLD AUTO: 0.9 K/UL (ref 0.3–1)
MONOCYTES NFR BLD: 8.5 % (ref 4–15)
NEUTROPHILS # BLD AUTO: 7.5 K/UL (ref 1.8–7.7)
NEUTROPHILS NFR BLD: 73.3 % (ref 38–73)
NRBC BLD-RTO: 0 /100 WBC
PLATELET # BLD AUTO: 105 K/UL (ref 150–450)
PMV BLD AUTO: 12.9 FL (ref 9.2–12.9)
RBC # BLD AUTO: 3.93 M/UL (ref 4–5.4)
WBC # BLD AUTO: 10.21 K/UL (ref 3.9–12.7)

## 2024-07-29 PROCEDURE — 0502F SUBSEQUENT PRENATAL CARE: CPT | Mod: CPTII,S$GLB,, | Performed by: OBSTETRICS & GYNECOLOGY

## 2024-07-29 PROCEDURE — 85025 COMPLETE CBC W/AUTO DIFF WBC: CPT | Performed by: OBSTETRICS & GYNECOLOGY

## 2024-07-29 PROCEDURE — 36415 COLL VENOUS BLD VENIPUNCTURE: CPT | Performed by: OBSTETRICS & GYNECOLOGY

## 2024-07-29 PROCEDURE — 99999 PR PBB SHADOW E&M-EST. PATIENT-LVL III: CPT | Mod: PBBFAC,,, | Performed by: OBSTETRICS & GYNECOLOGY

## 2024-07-29 NOTE — PROGRESS NOTES
@37w4d: Doing well, denies CTX, LOF, VB. +FM.   GTP - repeat CBC today. If still dropping will consult heme and consider stopping ASA 81 mg.  Considering POPs vs OCPs for PP contraception.   Keep an eye on growth, consider US next week.   Considering COVID booster. Recommend prior to delivery to give some possible protection to baby.  RTC 1 week OB f/u. Labor/PROM/PreE/FM precautions reviewed.

## 2024-07-30 ENCOUNTER — PATIENT MESSAGE (OUTPATIENT)
Dept: OBSTETRICS AND GYNECOLOGY | Facility: CLINIC | Age: 34
End: 2024-07-30
Payer: COMMERCIAL

## 2024-07-30 ENCOUNTER — HOSPITAL ENCOUNTER (EMERGENCY)
Facility: OTHER | Age: 34
Discharge: HOME | End: 2024-07-30
Attending: OBSTETRICS & GYNECOLOGY
Payer: COMMERCIAL

## 2024-07-30 VITALS
HEART RATE: 97 BPM | DIASTOLIC BLOOD PRESSURE: 59 MMHG | OXYGEN SATURATION: 98 % | SYSTOLIC BLOOD PRESSURE: 104 MMHG | TEMPERATURE: 98 F | RESPIRATION RATE: 18 BRPM

## 2024-07-30 DIAGNOSIS — R68.89 FLU-LIKE SYMPTOMS: Primary | ICD-10-CM

## 2024-07-30 DIAGNOSIS — Z3A.37 37 WEEKS GESTATION OF PREGNANCY: ICD-10-CM

## 2024-07-30 PROCEDURE — 59025 FETAL NON-STRESS TEST: CPT

## 2024-07-30 PROCEDURE — 99284 EMERGENCY DEPT VISIT MOD MDM: CPT | Mod: 25

## 2024-07-30 PROCEDURE — 25000003 PHARM REV CODE 250: Performed by: OBSTETRICS & GYNECOLOGY

## 2024-07-30 RX ORDER — ACETAMINOPHEN 500 MG
1000 TABLET ORAL ONCE
Status: COMPLETED | OUTPATIENT
Start: 2024-07-30 | End: 2024-07-30

## 2024-07-30 RX ADMIN — ACETAMINOPHEN 1000 MG: 500 TABLET ORAL at 06:07

## 2024-07-31 ENCOUNTER — HOSPITAL ENCOUNTER (INPATIENT)
Facility: OTHER | Age: 34
LOS: 2 days | Discharge: HOME | End: 2024-08-02
Attending: OBSTETRICS & GYNECOLOGY | Admitting: OBSTETRICS & GYNECOLOGY
Payer: COMMERCIAL

## 2024-07-31 ENCOUNTER — TELEPHONE (OUTPATIENT)
Dept: OBSTETRICS AND GYNECOLOGY | Facility: OTHER | Age: 34
End: 2024-07-31
Payer: COMMERCIAL

## 2024-07-31 ENCOUNTER — ANESTHESIA (OUTPATIENT)
Dept: OBSTETRICS AND GYNECOLOGY | Facility: OTHER | Age: 34
End: 2024-07-31
Payer: COMMERCIAL

## 2024-07-31 ENCOUNTER — ANESTHESIA EVENT (OUTPATIENT)
Dept: OBSTETRICS AND GYNECOLOGY | Facility: OTHER | Age: 34
End: 2024-07-31
Payer: COMMERCIAL

## 2024-07-31 DIAGNOSIS — N93.9 VAGINAL BLEEDING: ICD-10-CM

## 2024-07-31 DIAGNOSIS — O47.9 UTERINE CONTRACTIONS AT GREATER THAN 20 WEEKS OF GESTATION: ICD-10-CM

## 2024-07-31 DIAGNOSIS — Z37.9 NORMAL LABOR: ICD-10-CM

## 2024-07-31 DIAGNOSIS — Z3A.37 37 WEEKS GESTATION OF PREGNANCY: ICD-10-CM

## 2024-07-31 PROBLEM — O26.843 UTERINE SIZE-DATE DISCREPANCY IN THIRD TRIMESTER: Status: RESOLVED | Noted: 2024-06-17 | Resolved: 2024-07-31

## 2024-07-31 PROBLEM — N91.2 AMENORRHEA: Status: RESOLVED | Noted: 2024-01-12 | Resolved: 2024-07-31

## 2024-07-31 PROBLEM — O21.9 NAUSEA AND VOMITING DURING PREGNANCY: Status: RESOLVED | Noted: 2024-02-23 | Resolved: 2024-07-31

## 2024-07-31 PROBLEM — Z34.03 ENCOUNTER FOR SUPERVISION OF NORMAL FIRST PREGNANCY IN THIRD TRIMESTER: Status: RESOLVED | Noted: 2024-03-22 | Resolved: 2024-07-31

## 2024-07-31 LAB
ABO + RH BLD: NORMAL
BASOPHILS # BLD AUTO: 0.03 K/UL (ref 0–0.2)
BASOPHILS # BLD AUTO: 0.03 K/UL (ref 0–0.2)
BASOPHILS # BLD AUTO: 0.04 K/UL (ref 0–0.2)
BASOPHILS NFR BLD: 0.3 % (ref 0–1.9)
BASOPHILS NFR BLD: 0.3 % (ref 0–1.9)
BASOPHILS NFR BLD: 0.4 % (ref 0–1.9)
BLD GP AB SCN CELLS X3 SERPL QL: NORMAL
DIFFERENTIAL METHOD BLD: ABNORMAL
EOSINOPHIL # BLD AUTO: 0 K/UL (ref 0–0.5)
EOSINOPHIL NFR BLD: 0.2 % (ref 0–8)
EOSINOPHIL NFR BLD: 0.2 % (ref 0–8)
EOSINOPHIL NFR BLD: 0.3 % (ref 0–8)
ERYTHROCYTE [DISTWIDTH] IN BLOOD BY AUTOMATED COUNT: 12.7 % (ref 11.5–14.5)
ERYTHROCYTE [DISTWIDTH] IN BLOOD BY AUTOMATED COUNT: 12.9 % (ref 11.5–14.5)
ERYTHROCYTE [DISTWIDTH] IN BLOOD BY AUTOMATED COUNT: 13 % (ref 11.5–14.5)
HCT VFR BLD AUTO: 31.2 % (ref 37–48.5)
HCT VFR BLD AUTO: 34 % (ref 37–48.5)
HCT VFR BLD AUTO: 34.4 % (ref 37–48.5)
HGB BLD-MCNC: 10.9 G/DL (ref 12–16)
HGB BLD-MCNC: 11.5 G/DL (ref 12–16)
HGB BLD-MCNC: 11.9 G/DL (ref 12–16)
IMM GRANULOCYTES # BLD AUTO: 0.1 K/UL (ref 0–0.04)
IMM GRANULOCYTES # BLD AUTO: 0.1 K/UL (ref 0–0.04)
IMM GRANULOCYTES # BLD AUTO: 0.11 K/UL (ref 0–0.04)
IMM GRANULOCYTES NFR BLD AUTO: 0.7 % (ref 0–0.5)
IMM GRANULOCYTES NFR BLD AUTO: 1.1 % (ref 0–0.5)
IMM GRANULOCYTES NFR BLD AUTO: 1.4 % (ref 0–0.5)
LYMPHOCYTES # BLD AUTO: 0.7 K/UL (ref 1–4.8)
LYMPHOCYTES # BLD AUTO: 0.7 K/UL (ref 1–4.8)
LYMPHOCYTES # BLD AUTO: 0.9 K/UL (ref 1–4.8)
LYMPHOCYTES NFR BLD: 6.5 % (ref 18–48)
LYMPHOCYTES NFR BLD: 7.3 % (ref 18–48)
LYMPHOCYTES NFR BLD: 8.1 % (ref 18–48)
MCH RBC QN AUTO: 31.5 PG (ref 27–31)
MCH RBC QN AUTO: 32 PG (ref 27–31)
MCH RBC QN AUTO: 32.1 PG (ref 27–31)
MCHC RBC AUTO-ENTMCNC: 33.8 G/DL (ref 32–36)
MCHC RBC AUTO-ENTMCNC: 34.6 G/DL (ref 32–36)
MCHC RBC AUTO-ENTMCNC: 34.9 G/DL (ref 32–36)
MCV RBC AUTO: 92 FL (ref 82–98)
MCV RBC AUTO: 93 FL (ref 82–98)
MCV RBC AUTO: 93 FL (ref 82–98)
MONOCYTES # BLD AUTO: 0.7 K/UL (ref 0.3–1)
MONOCYTES # BLD AUTO: 0.8 K/UL (ref 0.3–1)
MONOCYTES # BLD AUTO: 1.2 K/UL (ref 0.3–1)
MONOCYTES NFR BLD: 8.3 % (ref 4–15)
MONOCYTES NFR BLD: 8.5 % (ref 4–15)
MONOCYTES NFR BLD: 9.3 % (ref 4–15)
NEUTROPHILS # BLD AUTO: 11.6 K/UL (ref 1.8–7.7)
NEUTROPHILS # BLD AUTO: 6.6 K/UL (ref 1.8–7.7)
NEUTROPHILS # BLD AUTO: 7.2 K/UL (ref 1.8–7.7)
NEUTROPHILS NFR BLD: 81.4 % (ref 38–73)
NEUTROPHILS NFR BLD: 81.8 % (ref 38–73)
NEUTROPHILS NFR BLD: 83.9 % (ref 38–73)
NRBC BLD-RTO: 0 /100 WBC
PLATELET # BLD AUTO: 79 K/UL (ref 150–450)
PLATELET # BLD AUTO: 80 K/UL (ref 150–450)
PLATELET # BLD AUTO: 82 K/UL (ref 150–450)
PMV BLD AUTO: 12.7 FL (ref 9.2–12.9)
PMV BLD AUTO: 13 FL (ref 9.2–12.9)
PMV BLD AUTO: 13.4 FL (ref 9.2–12.9)
RBC # BLD AUTO: 3.4 M/UL (ref 4–5.4)
RBC # BLD AUTO: 3.65 M/UL (ref 4–5.4)
RBC # BLD AUTO: 3.72 M/UL (ref 4–5.4)
SPECIMEN OUTDATE: NORMAL
TREPONEMA PALLIDUM IGG+IGM AB [PRESENCE] IN SERUM OR PLASMA BY IMMUNOASSAY: NONREACTIVE
WBC # BLD AUTO: 13.84 K/UL (ref 3.9–12.7)
WBC # BLD AUTO: 8.1 K/UL (ref 3.9–12.7)
WBC # BLD AUTO: 8.86 K/UL (ref 3.9–12.7)

## 2024-07-31 PROCEDURE — 86900 BLOOD TYPING SEROLOGIC ABO: CPT | Performed by: STUDENT IN AN ORGANIZED HEALTH CARE EDUCATION/TRAINING PROGRAM

## 2024-07-31 PROCEDURE — 63600175 PHARM REV CODE 636 W HCPCS

## 2024-07-31 PROCEDURE — 86901 BLOOD TYPING SEROLOGIC RH(D): CPT | Performed by: STUDENT IN AN ORGANIZED HEALTH CARE EDUCATION/TRAINING PROGRAM

## 2024-07-31 PROCEDURE — 62326 NJX INTERLAMINAR LMBR/SAC: CPT

## 2024-07-31 PROCEDURE — 0KQM0ZZ REPAIR PERINEUM MUSCLE, OPEN APPROACH: ICD-10-PCS | Performed by: OBSTETRICS & GYNECOLOGY

## 2024-07-31 PROCEDURE — 11000001 HC ACUTE MED/SURG PRIVATE ROOM

## 2024-07-31 PROCEDURE — 86850 RBC ANTIBODY SCREEN: CPT | Performed by: STUDENT IN AN ORGANIZED HEALTH CARE EDUCATION/TRAINING PROGRAM

## 2024-07-31 PROCEDURE — 86593 SYPHILIS TEST NON-TREP QUANT: CPT | Performed by: STUDENT IN AN ORGANIZED HEALTH CARE EDUCATION/TRAINING PROGRAM

## 2024-07-31 PROCEDURE — 96360 HYDRATION IV INFUSION INIT: CPT

## 2024-07-31 PROCEDURE — 85025 COMPLETE CBC W/AUTO DIFF WBC: CPT | Mod: 91 | Performed by: STUDENT IN AN ORGANIZED HEALTH CARE EDUCATION/TRAINING PROGRAM

## 2024-07-31 PROCEDURE — 25000003 PHARM REV CODE 250

## 2024-07-31 PROCEDURE — 51701 INSERT BLADDER CATHETER: CPT

## 2024-07-31 PROCEDURE — 0UQMXZZ REPAIR VULVA, EXTERNAL APPROACH: ICD-10-PCS | Performed by: OBSTETRICS & GYNECOLOGY

## 2024-07-31 PROCEDURE — 72200005 HC VAGINAL DELIVERY LEVEL II

## 2024-07-31 PROCEDURE — 85025 COMPLETE CBC W/AUTO DIFF WBC: CPT | Performed by: OBSTETRICS & GYNECOLOGY

## 2024-07-31 PROCEDURE — 72200006 HC VAGINAL DELIVERY LEVEL III

## 2024-07-31 PROCEDURE — 59400 OBSTETRICAL CARE: CPT | Mod: AT,,, | Performed by: OBSTETRICS & GYNECOLOGY

## 2024-07-31 PROCEDURE — 25000003 PHARM REV CODE 250: Performed by: ANESTHESIOLOGY

## 2024-07-31 PROCEDURE — C1751 CATH, INF, PER/CENT/MIDLINE: HCPCS

## 2024-07-31 PROCEDURE — 51702 INSERT TEMP BLADDER CATH: CPT

## 2024-07-31 PROCEDURE — 99285 EMERGENCY DEPT VISIT HI MDM: CPT | Mod: 25

## 2024-07-31 PROCEDURE — 72100003 HC LABOR CARE, EA. ADDL. 8 HRS

## 2024-07-31 PROCEDURE — 85025 COMPLETE CBC W/AUTO DIFF WBC: CPT | Mod: 91

## 2024-07-31 PROCEDURE — 27200710 HC EPIDURAL INFUSION PUMP SET

## 2024-07-31 PROCEDURE — 10907ZC DRAINAGE OF AMNIOTIC FLUID, THERAPEUTIC FROM PRODUCTS OF CONCEPTION, VIA NATURAL OR ARTIFICIAL OPENING: ICD-10-PCS | Performed by: OBSTETRICS & GYNECOLOGY

## 2024-07-31 PROCEDURE — 72100002 HC LABOR CARE, 1ST 8 HOURS

## 2024-07-31 PROCEDURE — 4A1HXCZ MONITORING OF PRODUCTS OF CONCEPTION, CARDIAC RATE, EXTERNAL APPROACH: ICD-10-PCS | Performed by: OBSTETRICS & GYNECOLOGY

## 2024-07-31 PROCEDURE — 59025 FETAL NON-STRESS TEST: CPT

## 2024-07-31 RX ORDER — MISOPROSTOL 200 UG/1
800 TABLET ORAL ONCE AS NEEDED
Status: DISCONTINUED | OUTPATIENT
Start: 2024-07-31 | End: 2024-08-02 | Stop reason: HOSPADM

## 2024-07-31 RX ORDER — ACETAMINOPHEN 500 MG
1000 TABLET ORAL ONCE
Status: COMPLETED | OUTPATIENT
Start: 2024-07-31 | End: 2024-07-31

## 2024-07-31 RX ORDER — FENTANYL/BUPIVACAINE/NS/PF 2MCG/ML-.1
PLASTIC BAG, INJECTION (ML) INJECTION
Status: COMPLETED
Start: 2024-07-31 | End: 2024-07-31

## 2024-07-31 RX ORDER — SODIUM CITRATE AND CITRIC ACID MONOHYDRATE 334; 500 MG/5ML; MG/5ML
30 SOLUTION ORAL ONCE
OUTPATIENT
Start: 2024-07-31 | End: 2024-07-31

## 2024-07-31 RX ORDER — OXYTOCIN-SODIUM CHLORIDE 0.9% IV SOLN 30 UNIT/500ML 30-0.9/5 UT/ML-%
10 SOLUTION INTRAVENOUS ONCE
Status: DISCONTINUED | OUTPATIENT
Start: 2024-07-31 | End: 2024-07-31

## 2024-07-31 RX ORDER — OXYTOCIN-SODIUM CHLORIDE 0.9% IV SOLN 30 UNIT/500ML 30-0.9/5 UT/ML-%
10 SOLUTION INTRAVENOUS ONCE AS NEEDED
Status: DISCONTINUED | OUTPATIENT
Start: 2024-07-31 | End: 2024-07-31

## 2024-07-31 RX ORDER — METHYLERGONOVINE MALEATE 0.2 MG/ML
200 INJECTION INTRAVENOUS ONCE AS NEEDED
Status: DISCONTINUED | OUTPATIENT
Start: 2024-07-31 | End: 2024-07-31

## 2024-07-31 RX ORDER — ACETAMINOPHEN 325 MG/1
650 TABLET ORAL EVERY 6 HOURS PRN
Status: DISCONTINUED | OUTPATIENT
Start: 2024-07-31 | End: 2024-08-02 | Stop reason: HOSPADM

## 2024-07-31 RX ORDER — HYDROCORTISONE 25 MG/G
CREAM TOPICAL 3 TIMES DAILY PRN
Status: DISCONTINUED | OUTPATIENT
Start: 2024-07-31 | End: 2024-08-02 | Stop reason: HOSPADM

## 2024-07-31 RX ORDER — METHYLERGONOVINE MALEATE 0.2 MG/ML
200 INJECTION INTRAVENOUS ONCE AS NEEDED
Status: DISCONTINUED | OUTPATIENT
Start: 2024-07-31 | End: 2024-08-02 | Stop reason: HOSPADM

## 2024-07-31 RX ORDER — DOCUSATE SODIUM 100 MG/1
100 CAPSULE, LIQUID FILLED ORAL DAILY
Status: DISCONTINUED | OUTPATIENT
Start: 2024-07-31 | End: 2024-08-02 | Stop reason: HOSPADM

## 2024-07-31 RX ORDER — DIPHENHYDRAMINE HCL 25 MG
25 CAPSULE ORAL EVERY 4 HOURS PRN
Status: DISCONTINUED | OUTPATIENT
Start: 2024-07-31 | End: 2024-08-02 | Stop reason: HOSPADM

## 2024-07-31 RX ORDER — CALCIUM CARBONATE 200(500)MG
500 TABLET,CHEWABLE ORAL 3 TIMES DAILY PRN
Status: DISCONTINUED | OUTPATIENT
Start: 2024-07-31 | End: 2024-07-31

## 2024-07-31 RX ORDER — DOCUSATE SODIUM 100 MG/1
200 CAPSULE, LIQUID FILLED ORAL 2 TIMES DAILY PRN
Status: DISCONTINUED | OUTPATIENT
Start: 2024-07-31 | End: 2024-08-02 | Stop reason: HOSPADM

## 2024-07-31 RX ORDER — CARBOPROST TROMETHAMINE 250 UG/ML
250 INJECTION, SOLUTION INTRAMUSCULAR
Status: DISCONTINUED | OUTPATIENT
Start: 2024-07-31 | End: 2024-08-02 | Stop reason: HOSPADM

## 2024-07-31 RX ORDER — ACETAMINOPHEN 325 MG/1
650 TABLET ORAL EVERY 6 HOURS PRN
Status: DISCONTINUED | OUTPATIENT
Start: 2024-07-31 | End: 2024-07-31

## 2024-07-31 RX ORDER — OXYTOCIN-SODIUM CHLORIDE 0.9% IV SOLN 30 UNIT/500ML 30-0.9/5 UT/ML-%
95 SOLUTION INTRAVENOUS ONCE AS NEEDED
Status: DISCONTINUED | OUTPATIENT
Start: 2024-07-31 | End: 2024-08-02 | Stop reason: HOSPADM

## 2024-07-31 RX ORDER — HYDROCODONE BITARTRATE AND ACETAMINOPHEN 10; 325 MG/1; MG/1
1 TABLET ORAL EVERY 4 HOURS PRN
Status: DISCONTINUED | OUTPATIENT
Start: 2024-07-31 | End: 2024-08-02 | Stop reason: HOSPADM

## 2024-07-31 RX ORDER — LIDOCAINE HYDROCHLORIDE AND EPINEPHRINE 15; 5 MG/ML; UG/ML
INJECTION, SOLUTION EPIDURAL
Status: DISCONTINUED | OUTPATIENT
Start: 2024-07-31 | End: 2024-08-02

## 2024-07-31 RX ORDER — DIPHENOXYLATE HYDROCHLORIDE AND ATROPINE SULFATE 2.5; .025 MG/1; MG/1
2 TABLET ORAL EVERY 6 HOURS PRN
Status: DISCONTINUED | OUTPATIENT
Start: 2024-07-31 | End: 2024-08-02 | Stop reason: HOSPADM

## 2024-07-31 RX ORDER — OXYTOCIN 10 [USP'U]/ML
10 INJECTION, SOLUTION INTRAMUSCULAR; INTRAVENOUS ONCE AS NEEDED
Status: DISCONTINUED | OUTPATIENT
Start: 2024-07-31 | End: 2024-07-31

## 2024-07-31 RX ORDER — FAMOTIDINE 10 MG/ML
20 INJECTION INTRAVENOUS ONCE
OUTPATIENT
Start: 2024-07-31 | End: 2024-07-31

## 2024-07-31 RX ORDER — VENLAFAXINE HYDROCHLORIDE 37.5 MG/1
150 CAPSULE, EXTENDED RELEASE ORAL DAILY
Status: DISCONTINUED | OUTPATIENT
Start: 2024-07-31 | End: 2024-08-02 | Stop reason: HOSPADM

## 2024-07-31 RX ORDER — IBUPROFEN 600 MG/1
600 TABLET ORAL EVERY 6 HOURS
Status: DISCONTINUED | OUTPATIENT
Start: 2024-07-31 | End: 2024-08-02 | Stop reason: HOSPADM

## 2024-07-31 RX ORDER — SIMETHICONE 80 MG
1 TABLET,CHEWABLE ORAL EVERY 6 HOURS PRN
Status: DISCONTINUED | OUTPATIENT
Start: 2024-07-31 | End: 2024-08-02 | Stop reason: HOSPADM

## 2024-07-31 RX ORDER — CARBOPROST TROMETHAMINE 250 UG/ML
250 INJECTION, SOLUTION INTRAMUSCULAR
Status: DISCONTINUED | OUTPATIENT
Start: 2024-07-31 | End: 2024-07-31

## 2024-07-31 RX ORDER — TRANEXAMIC ACID 10 MG/ML
1000 INJECTION, SOLUTION INTRAVENOUS EVERY 30 MIN PRN
Status: DISCONTINUED | OUTPATIENT
Start: 2024-07-31 | End: 2024-07-31

## 2024-07-31 RX ORDER — ONDANSETRON 8 MG/1
8 TABLET, ORALLY DISINTEGRATING ORAL EVERY 8 HOURS PRN
Status: DISCONTINUED | OUTPATIENT
Start: 2024-07-31 | End: 2024-08-02 | Stop reason: HOSPADM

## 2024-07-31 RX ORDER — OXYTOCIN-SODIUM CHLORIDE 0.9% IV SOLN 30 UNIT/500ML 30-0.9/5 UT/ML-%
95 SOLUTION INTRAVENOUS ONCE
Status: DISCONTINUED | OUTPATIENT
Start: 2024-07-31 | End: 2024-07-31

## 2024-07-31 RX ORDER — LIDOCAINE HYDROCHLORIDE 10 MG/ML
10 INJECTION, SOLUTION INFILTRATION; PERINEURAL ONCE AS NEEDED
Status: DISCONTINUED | OUTPATIENT
Start: 2024-07-31 | End: 2024-07-31

## 2024-07-31 RX ORDER — FENTANYL/BUPIVACAINE/NS/PF 2MCG/ML-.1
PLASTIC BAG, INJECTION (ML) INJECTION CONTINUOUS PRN
Status: DISCONTINUED | OUTPATIENT
Start: 2024-07-31 | End: 2024-08-02

## 2024-07-31 RX ORDER — TRANEXAMIC ACID 10 MG/ML
1000 INJECTION, SOLUTION INTRAVENOUS EVERY 30 MIN PRN
Status: DISCONTINUED | OUTPATIENT
Start: 2024-07-31 | End: 2024-08-02 | Stop reason: HOSPADM

## 2024-07-31 RX ORDER — OXYTOCIN-SODIUM CHLORIDE 0.9% IV SOLN 30 UNIT/500ML 30-0.9/5 UT/ML-%
95 SOLUTION INTRAVENOUS ONCE
Status: COMPLETED | OUTPATIENT
Start: 2024-07-31 | End: 2024-07-31

## 2024-07-31 RX ORDER — HYDROCODONE BITARTRATE AND ACETAMINOPHEN 5; 325 MG/1; MG/1
1 TABLET ORAL EVERY 4 HOURS PRN
Status: DISCONTINUED | OUTPATIENT
Start: 2024-07-31 | End: 2024-08-02 | Stop reason: HOSPADM

## 2024-07-31 RX ORDER — MISOPROSTOL 200 UG/1
800 TABLET ORAL ONCE AS NEEDED
Status: COMPLETED | OUTPATIENT
Start: 2024-07-31 | End: 2024-07-31

## 2024-07-31 RX ORDER — OXYTOCIN-SODIUM CHLORIDE 0.9% IV SOLN 30 UNIT/500ML 30-0.9/5 UT/ML-%
10 SOLUTION INTRAVENOUS ONCE AS NEEDED
Status: DISCONTINUED | OUTPATIENT
Start: 2024-07-31 | End: 2024-08-02 | Stop reason: HOSPADM

## 2024-07-31 RX ORDER — LANOLIN ALCOHOL/MO/W.PET/CERES
1 CREAM (GRAM) TOPICAL DAILY
Status: DISCONTINUED | OUTPATIENT
Start: 2024-07-31 | End: 2024-08-02 | Stop reason: HOSPADM

## 2024-07-31 RX ORDER — DIPHENHYDRAMINE HYDROCHLORIDE 50 MG/ML
25 INJECTION INTRAMUSCULAR; INTRAVENOUS EVERY 4 HOURS PRN
Status: DISCONTINUED | OUTPATIENT
Start: 2024-07-31 | End: 2024-08-02 | Stop reason: HOSPADM

## 2024-07-31 RX ORDER — SIMETHICONE 80 MG
1 TABLET,CHEWABLE ORAL 4 TIMES DAILY PRN
Status: DISCONTINUED | OUTPATIENT
Start: 2024-07-31 | End: 2024-07-31

## 2024-07-31 RX ORDER — OXYTOCIN 10 [USP'U]/ML
10 INJECTION, SOLUTION INTRAMUSCULAR; INTRAVENOUS ONCE AS NEEDED
Status: DISCONTINUED | OUTPATIENT
Start: 2024-07-31 | End: 2024-08-02 | Stop reason: HOSPADM

## 2024-07-31 RX ORDER — ONDANSETRON HYDROCHLORIDE 2 MG/ML
4 INJECTION, SOLUTION INTRAVENOUS ONCE
Status: COMPLETED | OUTPATIENT
Start: 2024-07-31 | End: 2024-07-31

## 2024-07-31 RX ORDER — MISOPROSTOL 200 UG/1
800 TABLET ORAL ONCE AS NEEDED
Status: DISCONTINUED | OUTPATIENT
Start: 2024-07-31 | End: 2024-07-31

## 2024-07-31 RX ORDER — ONDANSETRON 8 MG/1
8 TABLET, ORALLY DISINTEGRATING ORAL EVERY 8 HOURS PRN
Status: DISCONTINUED | OUTPATIENT
Start: 2024-07-31 | End: 2024-07-31

## 2024-07-31 RX ORDER — SODIUM CHLORIDE, SODIUM LACTATE, POTASSIUM CHLORIDE, CALCIUM CHLORIDE 600; 310; 30; 20 MG/100ML; MG/100ML; MG/100ML; MG/100ML
INJECTION, SOLUTION INTRAVENOUS CONTINUOUS
Status: DISCONTINUED | OUTPATIENT
Start: 2024-07-31 | End: 2024-07-31

## 2024-07-31 RX ORDER — OXYTOCIN-SODIUM CHLORIDE 0.9% IV SOLN 30 UNIT/500ML 30-0.9/5 UT/ML-%
95 SOLUTION INTRAVENOUS ONCE AS NEEDED
Status: DISCONTINUED | OUTPATIENT
Start: 2024-07-31 | End: 2024-07-31

## 2024-07-31 RX ORDER — SODIUM CHLORIDE 0.9 % (FLUSH) 0.9 %
10 SYRINGE (ML) INJECTION EVERY 6 HOURS PRN
Status: DISCONTINUED | OUTPATIENT
Start: 2024-07-31 | End: 2024-08-02 | Stop reason: HOSPADM

## 2024-07-31 RX ORDER — PRENATAL WITH FERROUS FUM AND FOLIC ACID 3080; 920; 120; 400; 22; 1.84; 3; 20; 10; 1; 12; 200; 27; 25; 2 [IU]/1; [IU]/1; MG/1; [IU]/1; MG/1; MG/1; MG/1; MG/1; MG/1; MG/1; UG/1; MG/1; MG/1; MG/1; MG/1
1 TABLET ORAL DAILY
Status: DISCONTINUED | OUTPATIENT
Start: 2024-08-01 | End: 2024-08-02 | Stop reason: HOSPADM

## 2024-07-31 RX ORDER — DIPHENOXYLATE HYDROCHLORIDE AND ATROPINE SULFATE 2.5; .025 MG/1; MG/1
2 TABLET ORAL EVERY 6 HOURS PRN
Status: DISCONTINUED | OUTPATIENT
Start: 2024-07-31 | End: 2024-07-31

## 2024-07-31 RX ADMIN — ACETAMINOPHEN 1000 MG: 500 TABLET ORAL at 06:07

## 2024-07-31 RX ADMIN — DOCUSATE SODIUM 100 MG: 100 CAPSULE, LIQUID FILLED ORAL at 11:07

## 2024-07-31 RX ADMIN — Medication 2.5 ML: at 10:07

## 2024-07-31 RX ADMIN — IBUPROFEN 600 MG: 600 TABLET, FILM COATED ORAL at 11:07

## 2024-07-31 RX ADMIN — MISOPROSTOL 800 MCG: 200 TABLET ORAL at 04:07

## 2024-07-31 RX ADMIN — ONDANSETRON 4 MG: 2 INJECTION INTRAMUSCULAR; INTRAVENOUS at 05:07

## 2024-07-31 RX ADMIN — LIDOCAINE HYDROCHLORIDE,EPINEPHRINE BITARTRATE 3 ML: 15; .005 INJECTION, SOLUTION EPIDURAL; INFILTRATION; INTRACAUDAL; PERINEURAL at 10:07

## 2024-07-31 RX ADMIN — Medication 95 MILLI-UNITS/MIN: at 04:07

## 2024-07-31 RX ADMIN — FERROUS SULFATE TAB 325 MG (65 MG ELEMENTAL FE) 1 EACH: 325 (65 FE) TAB at 11:07

## 2024-07-31 RX ADMIN — SODIUM CHLORIDE, POTASSIUM CHLORIDE, SODIUM LACTATE AND CALCIUM CHLORIDE 1000 ML: 600; 310; 30; 20 INJECTION, SOLUTION INTRAVENOUS at 06:07

## 2024-07-31 RX ADMIN — SODIUM CHLORIDE, POTASSIUM CHLORIDE, SODIUM LACTATE AND CALCIUM CHLORIDE 1000 ML: 600; 310; 30; 20 INJECTION, SOLUTION INTRAVENOUS at 10:07

## 2024-07-31 RX ADMIN — Medication 10 ML/HR: at 10:07

## 2024-07-31 NOTE — ANESTHESIA PROCEDURE NOTES
Epidural    Patient location during procedure: OB   Reason for block: primary anesthetic   Reason for block: labor analgesia requested by patient and obstetrician  Diagnosis: IUP   Start time: 7/31/2024 10:37 AM  Timeout: 7/31/2024 10:37 AM  End time: 7/31/2024 10:57 AM  Surgery related to: Vaginal Delivery    Staffing  Performing Provider: Nadja Laird DO  Authorizing Provider: Juan Jose Ramos III, MD    Staffing  Performed by: Nadja Laird DO  Authorized by: Juan Jose Ramos III, MD        Preanesthetic Checklist  Completed: patient identified, IV checked, site marked, risks and benefits discussed, surgical consent, monitors and equipment checked, pre-op evaluation, timeout performed, anesthesia consent given, hand hygiene performed and patient being monitored  Preparation  Patient position: sitting  Prep: ChloraPrep  Patient monitoring: Pulse Ox and Blood Pressure  Reason for block: primary anesthetic   Epidural  Skin Anesthetic: lidocaine 1%  Skin Wheal: 3 mL  Administration type: continuous  Approach: midline  Interspace: L3-4    Injection technique: LEBRON air  Needle and Epidural Catheter  Needle type: Tuohy   Needle gauge: 17  Needle length: 3.5 inches  Needle insertion depth: 4 cm  Catheter type: Portable Internet  Catheter size: 19 G  Catheter at skin depth: 9 cm  Insertion Attempts: 1  Test dose: 3 mL of lidocaine 1.5% with Epi 1-to-200,000  Additional Documentation: incremental injection, negative aspiration for heme and CSF, no paresthesia on injection, no signs/symptoms of IV or SA injection, no significant pain on injection and no significant complaints from patient  Needle localization: anatomical landmarks  Medications:  Volume per aspiration: 5 mL  Time between aspirations: 5 minutes   Assessment  Ease of block: easy  Patient's tolerance of the procedure: comfortable throughout block and no complaints No inadvertent dural puncture with Tuohy.  Dural puncture performed with spinal  needle.

## 2024-07-31 NOTE — ANESTHESIA PREPROCEDURE EVALUATION
[facility]  Anesthesia Pre-Operative Evaluation         Patient Name: Norma Manjarrez  YOB: 1990  MRN: [id number]    2024      Norma Manjarrez is a 33 y.o. female  @ 37w6d who presents in labor. IUP complicated by gestational thrombocytopenia, anemia.     Patient denies cardiopulmonary disease, bleeding disorders, or spine pathology.     OB History    Para Term  AB Living   1 0 0 0 0 0   SAB IAB Ectopic Multiple Live Births   0 0 0 0        # Outcome Date GA Lbr Ajay/2nd Weight Sex Type Anes PTL Lv   1 Current                Review of patient's allergies indicates:  No Known Allergies    Wt Readings from Last 1 Encounters:   24 0943 84.9 kg (187 lb 2.7 oz)       BP Readings from Last 3 Encounters:   24 124/79   24 (!) 104/59   24 122/86       Patient Active Problem List   Diagnosis    Generalized anxiety disorder with panic attacks    Adjustment disorder with mixed anxiety and depressed mood    Anemia during pregnancy in third trimester    Benign gestational thrombocytopenia in third trimester    Normal labor       Past Surgical History:   Procedure Laterality Date    HAND SURGERY Right     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 No    Breast-feeding No     Social Determinants of Health     Financial Resource Strain: Low Risk  (2023)    Overall  "Financial Resource Strain (CARDIA)     Difficulty of Paying Living Expenses: Not hard at all   Food Insecurity: No Food Insecurity (12/4/2023)    Hunger Vital Sign     Worried About Running Out of Food in the Last Year: Never true     Ran Out of Food in the Last Year: Never true   Transportation Needs: No Transportation Needs (12/4/2023)    PRAPARE - Transportation     Lack of Transportation (Medical): No     Lack of Transportation (Non-Medical): No   Physical Activity: Sufficiently Active (12/4/2023)    Exercise Vital Sign     Days of Exercise per Week: 6 days     Minutes of Exercise per Session: 40 min   Stress: Stress Concern Present (12/4/2023)    [employer] - Occupational Stress Questionnaire     Feeling of Stress : To some extent   Housing Stability: Low Risk  (12/4/2023)    Housing Stability Vital Sign     Unable to Pay for Housing in the Last Year: No     Number of Places Lived in the Last Year: 1     Unstable Housing in the Last Year: No         Chemistry        Component Value Date/Time     01/12/2024 1016    K 3.4 (L) 01/12/2024 1016     01/12/2024 1016    CO2 22 (L) 01/12/2024 1016    BUN 9 01/12/2024 1016    CREATININE 0.7 01/12/2024 1016    GLU 70 01/12/2024 1016        Component Value Date/Time    CALCIUM 8.7 01/12/2024 1016    ALKPHOS 40 (L) 12/11/2023 1208    AST 22 12/11/2023 1208    ALT 18 12/11/2023 1208    BILITOT 0.4 12/11/2023 1208    ESTGFRAFRICA >60.0 08/24/2021 1510    EGFRNONAA >60.0 08/24/2021 1510            Lab Results   Component Value Date    WBC 8.10 07/31/2024    HGB 11.9 (L) 07/31/2024    HCT 34.4 (L) 07/31/2024    MCV 93 07/31/2024    PLT 82 (L) 07/31/2024       No results for input(s): "PT", "INR", "PROTIME", "APTT" in the last 72 hours.      Pre-op Assessment    I have reviewed the Patient Summary Reports.     I have reviewed the Nursing Notes. I have reviewed the NPO Status.   I have reviewed the Medications.     Review of " Systems  Hematology/Oncology:       -- Anemia:                                      Physical Exam  General: Alert and Oriented    Airway:  Mallampati: II   Mouth Opening: Normal  TM Distance: Normal  Tongue: Normal  Neck ROM: Normal ROM    Dental:  Intact        Anesthesia Plan  Type of Anesthesia, risks & benefits discussed:    Anesthesia Type: Epidural, Spinal, CSE  Intra-op Monitoring Plan: Standard ASA Monitors  Post Op Pain Control Plan: multimodal analgesia  Induction:  IV  Airway Plan: Video  ASA Score: 3  Day of Surgery Review of History & Physical: H&P Update referred to the surgeon/provider.    Ready For Surgery From Anesthesia Perspective.     .

## 2024-08-01 ENCOUNTER — TELEPHONE (OUTPATIENT)
Dept: OBSTETRICS AND GYNECOLOGY | Facility: CLINIC | Age: 34
End: 2024-08-01

## 2024-08-01 LAB
BASOPHILS # BLD AUTO: 0.03 K/UL (ref 0–0.2)
BASOPHILS NFR BLD: 0.2 % (ref 0–1.9)
DIFFERENTIAL METHOD BLD: ABNORMAL
EOSINOPHIL # BLD AUTO: 0.1 K/UL (ref 0–0.5)
EOSINOPHIL NFR BLD: 0.4 % (ref 0–8)
ERYTHROCYTE [DISTWIDTH] IN BLOOD BY AUTOMATED COUNT: 12.9 % (ref 11.5–14.5)
HCT VFR BLD AUTO: 30.1 % (ref 37–48.5)
HGB BLD-MCNC: 10.6 G/DL (ref 12–16)
IMM GRANULOCYTES # BLD AUTO: 0.11 K/UL (ref 0–0.04)
IMM GRANULOCYTES NFR BLD AUTO: 0.8 % (ref 0–0.5)
LYMPHOCYTES # BLD AUTO: 1.4 K/UL (ref 1–4.8)
LYMPHOCYTES NFR BLD: 9.5 % (ref 18–48)
MCH RBC QN AUTO: 32.2 PG (ref 27–31)
MCHC RBC AUTO-ENTMCNC: 35.2 G/DL (ref 32–36)
MCV RBC AUTO: 92 FL (ref 82–98)
MONOCYTES # BLD AUTO: 1.2 K/UL (ref 0.3–1)
MONOCYTES NFR BLD: 8.2 % (ref 4–15)
NEUTROPHILS # BLD AUTO: 11.7 K/UL (ref 1.8–7.7)
NEUTROPHILS NFR BLD: 80.9 % (ref 38–73)
NRBC BLD-RTO: 0 /100 WBC
PLATELET # BLD AUTO: 91 K/UL (ref 150–450)
PMV BLD AUTO: 12.6 FL (ref 9.2–12.9)
RBC # BLD AUTO: 3.29 M/UL (ref 4–5.4)
WBC # BLD AUTO: 14.47 K/UL (ref 3.9–12.7)

## 2024-08-01 PROCEDURE — 99024 POSTOP FOLLOW-UP VISIT: CPT | Mod: ,,, | Performed by: ADVANCED PRACTICE MIDWIFE

## 2024-08-01 PROCEDURE — 85025 COMPLETE CBC W/AUTO DIFF WBC: CPT

## 2024-08-01 PROCEDURE — 11000001 HC ACUTE MED/SURG PRIVATE ROOM

## 2024-08-01 PROCEDURE — 99024 POSTOP FOLLOW-UP VISIT: CPT | Mod: ,,, | Performed by: OBSTETRICS & GYNECOLOGY

## 2024-08-01 PROCEDURE — 25000003 PHARM REV CODE 250

## 2024-08-01 PROCEDURE — 36415 COLL VENOUS BLD VENIPUNCTURE: CPT

## 2024-08-01 RX ADMIN — IBUPROFEN 600 MG: 600 TABLET, FILM COATED ORAL at 05:08

## 2024-08-01 RX ADMIN — IBUPROFEN 600 MG: 600 TABLET, FILM COATED ORAL at 11:08

## 2024-08-01 RX ADMIN — VENLAFAXINE HYDROCHLORIDE 150 MG: 75 CAPSULE, EXTENDED RELEASE ORAL at 07:08

## 2024-08-01 RX ADMIN — FERROUS SULFATE TAB 325 MG (65 MG ELEMENTAL FE) 1 EACH: 325 (65 FE) TAB at 07:08

## 2024-08-01 RX ADMIN — DOCUSATE SODIUM 200 MG: 100 CAPSULE, LIQUID FILLED ORAL at 10:08

## 2024-08-01 RX ADMIN — DOCUSATE SODIUM 200 MG: 100 CAPSULE, LIQUID FILLED ORAL at 07:08

## 2024-08-01 RX ADMIN — IBUPROFEN 600 MG: 600 TABLET, FILM COATED ORAL at 10:08

## 2024-08-01 RX ADMIN — PRENATAL VIT W/ FE FUMARATE-FA TAB 27-0.8 MG 1 TABLET: 27-0.8 TAB at 07:08

## 2024-08-01 NOTE — TELEPHONE ENCOUNTER
Called pt and scheduled 2w PP virtual mood check (8/15) and 6w PP on (9/9) BAP. Pt was satisfied w both appts

## 2024-08-01 NOTE — ANESTHESIA POSTPROCEDURE EVALUATION
Anesthesia Post Evaluation    Patient: Norma Manjarrez    Procedure(s) Performed: * No procedures listed *    Final Anesthesia Type: epidural      Patient location during evaluation: labor & delivery  Patient participation: Yes- Able to Participate  Level of consciousness: awake and alert  Post-procedure vital signs: reviewed and stable  Pain management: adequate  Airway patency: patent  CARMEN mitigation strategies: Use of major conduction anesthesia (spinal/epidural) or peripheral nerve block and Multimodal analgesia  PONV status at discharge: No PONV  Anesthetic complications: no      Cardiovascular status: blood pressure returned to baseline and hemodynamically stable  Respiratory status: unassisted and spontaneous ventilation  Hydration status: euvolemic  Follow-up not needed.              Vitals Value Taken Time   /76 08/01/24 0807   Temp 36.6 °C (97.8 °F) 08/01/24 0807   Pulse 74 08/01/24 0807   Resp 18 08/01/24 0807   SpO2 99 % 08/01/24 0807         No case tracking events are documented in the log.      Pain/Peggy Score: Pain Rating Prior to Med Admin: 4 (8/1/2024 11:37 AM)  Pain Rating Post Med Admin: 2 (8/1/2024 12:15 PM)

## 2024-08-02 VITALS
BODY MASS INDEX: 26.8 KG/M2 | DIASTOLIC BLOOD PRESSURE: 82 MMHG | HEIGHT: 70 IN | TEMPERATURE: 98 F | SYSTOLIC BLOOD PRESSURE: 122 MMHG | WEIGHT: 187.19 LBS | OXYGEN SATURATION: 98 % | RESPIRATION RATE: 18 BRPM | HEART RATE: 81 BPM

## 2024-08-02 PROCEDURE — 25000003 PHARM REV CODE 250

## 2024-08-02 PROCEDURE — 99024 POSTOP FOLLOW-UP VISIT: CPT | Mod: ,,, | Performed by: OBSTETRICS & GYNECOLOGY

## 2024-08-02 RX ORDER — IBUPROFEN 600 MG/1
600 TABLET ORAL EVERY 6 HOURS
Qty: 30 TABLET | Refills: 1 | Status: SHIPPED | OUTPATIENT
Start: 2024-08-02

## 2024-08-02 RX ORDER — ACETAMINOPHEN 325 MG/1
650 TABLET ORAL EVERY 6 HOURS
Qty: 30 TABLET | Refills: 1 | Status: SHIPPED | OUTPATIENT
Start: 2024-08-02

## 2024-08-02 RX ORDER — DOCUSATE SODIUM 100 MG/1
200 CAPSULE, LIQUID FILLED ORAL 2 TIMES DAILY PRN
Qty: 60 CAPSULE | Refills: 1 | Status: SHIPPED | OUTPATIENT
Start: 2024-08-02

## 2024-08-02 RX ADMIN — PRENATAL VIT W/ FE FUMARATE-FA TAB 27-0.8 MG 1 TABLET: 27-0.8 TAB at 09:08

## 2024-08-02 RX ADMIN — IBUPROFEN 600 MG: 600 TABLET, FILM COATED ORAL at 11:08

## 2024-08-02 RX ADMIN — IBUPROFEN 600 MG: 600 TABLET, FILM COATED ORAL at 06:08

## 2024-08-02 RX ADMIN — DOCUSATE SODIUM 100 MG: 100 CAPSULE, LIQUID FILLED ORAL at 09:08

## 2024-08-02 RX ADMIN — VENLAFAXINE HYDROCHLORIDE 150 MG: 75 CAPSULE, EXTENDED RELEASE ORAL at 09:08

## 2024-08-02 RX ADMIN — FERROUS SULFATE TAB 325 MG (65 MG ELEMENTAL FE) 1 EACH: 325 (65 FE) TAB at 09:08

## 2024-08-04 ENCOUNTER — PATIENT MESSAGE (OUTPATIENT)
Dept: INTERNAL MEDICINE | Facility: CLINIC | Age: 34
End: 2024-08-04
Payer: COMMERCIAL

## 2024-08-04 DIAGNOSIS — F43.23 ADJUSTMENT DISORDER WITH MIXED ANXIETY AND DEPRESSED MOOD: Primary | ICD-10-CM

## 2024-08-04 DIAGNOSIS — F41.0 GENERALIZED ANXIETY DISORDER WITH PANIC ATTACKS: ICD-10-CM

## 2024-08-04 DIAGNOSIS — F41.1 GENERALIZED ANXIETY DISORDER WITH PANIC ATTACKS: ICD-10-CM

## 2024-08-05 ENCOUNTER — PATIENT MESSAGE (OUTPATIENT)
Dept: OBSTETRICS AND GYNECOLOGY | Facility: OTHER | Age: 34
End: 2024-08-05
Payer: COMMERCIAL

## 2024-08-05 RX ORDER — VENLAFAXINE HYDROCHLORIDE 75 MG/1
75 CAPSULE, EXTENDED RELEASE ORAL DAILY
Qty: 30 CAPSULE | Refills: 11 | Status: SHIPPED | OUTPATIENT
Start: 2024-08-05 | End: 2025-08-05

## 2024-08-05 NOTE — TELEPHONE ENCOUNTER
Pt reports giving birth recently and has post partum anxiety    Currently taking effexor XR 75 mg and would like to know if she can increase while breastfeeding

## 2024-08-12 ENCOUNTER — PATIENT MESSAGE (OUTPATIENT)
Facility: CLINIC | Age: 34
End: 2024-08-12
Payer: COMMERCIAL

## 2024-08-15 ENCOUNTER — OFFICE VISIT (OUTPATIENT)
Dept: OBSTETRICS AND GYNECOLOGY | Facility: CLINIC | Age: 34
End: 2024-08-15
Payer: COMMERCIAL

## 2024-08-15 ENCOUNTER — TELEPHONE (OUTPATIENT)
Dept: OBSTETRICS AND GYNECOLOGY | Facility: CLINIC | Age: 34
End: 2024-08-15
Payer: COMMERCIAL

## 2024-08-15 DIAGNOSIS — D69.6 BENIGN GESTATIONAL THROMBOCYTOPENIA IN THIRD TRIMESTER: ICD-10-CM

## 2024-08-15 DIAGNOSIS — O99.113 BENIGN GESTATIONAL THROMBOCYTOPENIA IN THIRD TRIMESTER: ICD-10-CM

## 2024-08-15 DIAGNOSIS — F41.0 GENERALIZED ANXIETY DISORDER WITH PANIC ATTACKS: ICD-10-CM

## 2024-08-15 DIAGNOSIS — F41.1 GENERALIZED ANXIETY DISORDER WITH PANIC ATTACKS: ICD-10-CM

## 2024-08-15 PROCEDURE — 1160F RVW MEDS BY RX/DR IN RCRD: CPT | Mod: CPTII,95,, | Performed by: OBSTETRICS & GYNECOLOGY

## 2024-08-15 PROCEDURE — 1159F MED LIST DOCD IN RCRD: CPT | Mod: CPTII,95,, | Performed by: OBSTETRICS & GYNECOLOGY

## 2024-08-15 PROCEDURE — 0503F POSTPARTUM CARE VISIT: CPT | Mod: 95,CPTII,, | Performed by: OBSTETRICS & GYNECOLOGY

## 2024-08-15 NOTE — ASSESSMENT & PLAN NOTE
Doing well overall PP since increase Effexor dosage. No s/sx of severe PP depression, anxiety, or psychosis. Denies SI/HI. Precautions reviewed. Continue pelvic rest until 6 wk PP visit, will discuss PP contraception.    Will need CBC at 6 week PP visit to f/u GTP.

## 2024-08-15 NOTE — TELEPHONE ENCOUNTER
Called and spoke w pt. Informed her of labs scheduled after her appt on 9/9 PP visit at [facility]. Pt verbalized understanding.

## 2024-08-15 NOTE — PROGRESS NOTES
The patient location is: [facility]  The chief complaint leading to consultation is: PP Mood check  Visit type: audiovisual  Total time spent with patient: 10 minutes    Each patient to whom he or she provides medical services by telemedicine is:  (1) informed of the relationship between the physician and patient and the respective role of any other health care provider with respect to management of the patient; and (2) notified that he or she may decline to receive medical services by telemedicine and may withdraw from such care at any time.    Chief Complaint: Mood check     HPI:      Norma Manjarrez is a 33 y.o.  who presents via virtual visit for 2 week postpartum mood check. She is s/p  24, spontaneous labor at 37w6d. Delivered a VFI (Gissel) 7#4oz, Apgars 7/9. Bilateral periurethral and bilateral labial lacerations repaired at delivery. Cytotec given for intermittent DILLON atony.  cc. Pregnancy complicated by GTP and anxiety. Baby spent first night in NICU for TTN. She was struggling with increased crying spells in the first week and reached out to her PCP, increased dosage of Effexor and symptoms are improving. Sleeping as long as 4 hours a night now. Mainly pumping. Breastfeeding about once a day, also supplementing formula. Denies significant depression, anhedonia, SI/HI. Mom is around in the first two weeks helping out. Seeing her personal therapist and has another appointment in two weeks.      ROS:     GENERAL: Denies fevers or chills. Feeling well overall.   ABDOMEN: Denies abdominal pain, constipation, diarrhea, nausea, vomiting, change in appetite.   URINARY: Denies frequency, dysuria, hematuria.  GYNECOLOGIC: See HPI.  NEUROLOGIC: Denies syncope or weakness.     Physical Exam:      PHYSICAL EXAM:  LMP 2023 (Exact Date)   There is no height or weight on file to calculate BMI.     APPEARANCE: Well nourished, well developed, in no acute distress.  Exam deferred  due to televisit      Assessment/Plan:     Problem List Items Addressed This Visit          Psychiatric    Generalized anxiety disorder with panic attacks       Hematology    Benign gestational thrombocytopenia in third trimester    Relevant Orders    CBC Auto Differential       Obstetric     (spontaneous vaginal delivery)    Postpartum care and examination - Primary    Current Assessment & Plan     Doing well overall PP since increase Effexor dosage. No s/sx of severe PP depression, anxiety, or psychosis. Denies SI/HI. Precautions reviewed. Continue pelvic rest until 6 wk PP visit, will discuss PP contraception.    Will need CBC at 6 week PP visit to f/u GTP.             Counseling:       Use of the [facility] Patient Portal discussed and encouraged during today's visit.       Brittanie Ag MD  Department of Obstetrics & Gynecology  [facility]

## 2024-08-16 ENCOUNTER — TELEPHONE (OUTPATIENT)
Dept: OBSTETRICS AND GYNECOLOGY | Facility: CLINIC | Age: 34
End: 2024-08-16
Payer: COMMERCIAL

## 2024-08-16 NOTE — TELEPHONE ENCOUNTER
Called pt. Informed her to go to the [facility] to be evaluated. Pt verbalized understanding and states she will head over there.

## 2024-08-16 NOTE — TELEPHONE ENCOUNTER
----- Message from Juanito Mora sent at 8/16/2024  9:47 AM CDT -----      Name of Who is Calling: OSVALDO ARRIOLA [[id number]]      What is the request in detail: pt called to report there is a string hanging from incision and seems as if it's attached to something.Please contact to further discuss and advise.          Can the clinic reply by [facility]: Y      What Number to Call Back if not in [facility]:  [phone]

## 2024-08-16 NOTE — TELEPHONE ENCOUNTER
Called pt. Pt states this morning she noticed a string hanging out of her vaginal area while getting out of the shower. She is experiencing about a 4/10 sharp pain. She can visibly see the strings and states she can tell it is connected to a knot. I told pt I would try and see if we can get her in with a provider today to check, and that I would call her back. Pt verbalized understanding.

## 2024-09-09 ENCOUNTER — LAB VISIT (OUTPATIENT)
Dept: LAB | Facility: OTHER | Age: 34
End: 2024-09-09
Attending: OBSTETRICS & GYNECOLOGY
Payer: COMMERCIAL

## 2024-09-09 ENCOUNTER — POSTPARTUM VISIT (OUTPATIENT)
Dept: OBSTETRICS AND GYNECOLOGY | Facility: CLINIC | Age: 34
End: 2024-09-09
Payer: COMMERCIAL

## 2024-09-09 VITALS
WEIGHT: 164.69 LBS | DIASTOLIC BLOOD PRESSURE: 82 MMHG | HEIGHT: 70 IN | BODY MASS INDEX: 23.58 KG/M2 | SYSTOLIC BLOOD PRESSURE: 112 MMHG

## 2024-09-09 DIAGNOSIS — F41.1 GENERALIZED ANXIETY DISORDER WITH PANIC ATTACKS: ICD-10-CM

## 2024-09-09 DIAGNOSIS — Z30.09 ENCOUNTER FOR COUNSELING REGARDING CONTRACEPTION: ICD-10-CM

## 2024-09-09 DIAGNOSIS — D69.6 BENIGN GESTATIONAL THROMBOCYTOPENIA IN THIRD TRIMESTER: ICD-10-CM

## 2024-09-09 DIAGNOSIS — O99.113 BENIGN GESTATIONAL THROMBOCYTOPENIA IN THIRD TRIMESTER: ICD-10-CM

## 2024-09-09 DIAGNOSIS — F41.0 GENERALIZED ANXIETY DISORDER WITH PANIC ATTACKS: ICD-10-CM

## 2024-09-09 DIAGNOSIS — B37.31 YEAST VAGINITIS: ICD-10-CM

## 2024-09-09 LAB
BASOPHILS # BLD AUTO: 0.04 K/UL (ref 0–0.2)
BASOPHILS NFR BLD: 0.6 % (ref 0–1.9)
DIFFERENTIAL METHOD BLD: ABNORMAL
EOSINOPHIL # BLD AUTO: 0.3 K/UL (ref 0–0.5)
EOSINOPHIL NFR BLD: 3.4 % (ref 0–8)
ERYTHROCYTE [DISTWIDTH] IN BLOOD BY AUTOMATED COUNT: 11.3 % (ref 11.5–14.5)
HCT VFR BLD AUTO: 40.7 % (ref 37–48.5)
HGB BLD-MCNC: 13.4 G/DL (ref 12–16)
IMM GRANULOCYTES # BLD AUTO: 0.03 K/UL (ref 0–0.04)
IMM GRANULOCYTES NFR BLD AUTO: 0.4 % (ref 0–0.5)
LYMPHOCYTES # BLD AUTO: 2.2 K/UL (ref 1–4.8)
LYMPHOCYTES NFR BLD: 30 % (ref 18–48)
MCH RBC QN AUTO: 30.7 PG (ref 27–31)
MCHC RBC AUTO-ENTMCNC: 32.9 G/DL (ref 32–36)
MCV RBC AUTO: 93 FL (ref 82–98)
MONOCYTES # BLD AUTO: 0.6 K/UL (ref 0.3–1)
MONOCYTES NFR BLD: 7.7 % (ref 4–15)
NEUTROPHILS # BLD AUTO: 4.2 K/UL (ref 1.8–7.7)
NEUTROPHILS NFR BLD: 57.9 % (ref 38–73)
NRBC BLD-RTO: 0 /100 WBC
PLATELET # BLD AUTO: 223 K/UL (ref 150–450)
PMV BLD AUTO: 11.6 FL (ref 9.2–12.9)
RBC # BLD AUTO: 4.37 M/UL (ref 4–5.4)
WBC # BLD AUTO: 7.26 K/UL (ref 3.9–12.7)

## 2024-09-09 PROCEDURE — 0503F POSTPARTUM CARE VISIT: CPT | Mod: CPTII,S$GLB,, | Performed by: OBSTETRICS & GYNECOLOGY

## 2024-09-09 PROCEDURE — 99999 PR PBB SHADOW E&M-EST. PATIENT-LVL III: CPT | Mod: PBBFAC,,, | Performed by: OBSTETRICS & GYNECOLOGY

## 2024-09-09 PROCEDURE — 85025 COMPLETE CBC W/AUTO DIFF WBC: CPT | Performed by: OBSTETRICS & GYNECOLOGY

## 2024-09-09 PROCEDURE — 36415 COLL VENOUS BLD VENIPUNCTURE: CPT | Performed by: OBSTETRICS & GYNECOLOGY

## 2024-09-09 RX ORDER — NORETHINDRONE ACETATE AND ETHINYL ESTRADIOL .02; 1 MG/1; MG/1
1 TABLET ORAL DAILY
Qty: 21 TABLET | Refills: 11 | Status: SHIPPED | OUTPATIENT
Start: 2024-09-09 | End: 2025-09-09

## 2024-09-09 RX ORDER — FLUCONAZOLE 150 MG/1
150 TABLET ORAL ONCE
Qty: 1 TABLET | Refills: 0 | Status: SHIPPED | OUTPATIENT
Start: 2024-09-09 | End: 2024-09-11

## 2024-09-09 NOTE — ASSESSMENT & PLAN NOTE
Normal pelvic exam, lacerations well healed but with some granulation and significant discharge. Will send Rx for Diflucan. Recommend pelvic rest for another week and use of water based lubricant to avoid friction. Formula feeding. OCPs for contraception, no absolute contraindications. Counseled about risks of unintended pregnancy, safe pregnancy spacing, and continuing PNV. No signs of PP depression, EPDS score 7. Pap up to date.     CBC today for h/o GTP.

## 2024-09-09 NOTE — PROGRESS NOTES
Chief Complaint   Patient presents with    Postpartum Care       HPI:   33 y.o.  here today for 6 week PP visit. She is s/p  24, spontaneous labor at 37w6d. Delivered a VFI (Gissel) 7#4oz, Apgars 7/9. Bilateral periurethral and bilateral labial lacerations repaired at delivery. Cytotec given for intermittent DILLON atony.  cc. Pregnancy complicated by GTP and anxiety. Baby spent first night in NICU for TTN. Anxiety and mood issues feeling better; increased dosage of Effexor and symptoms are improving. Sleeping as long as 4 hours a night now. Transitioned to formula. Denies significant depression, anhedonia, SI/HI. Good support at home. Some increased vaginal discharge. Denies vaginal bleeding or cramping.       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 (Patient not taking: Reported on 2024), Disp: 30 tablet, Rfl: 1    fluconazole (DIFLUCAN) 150 MG Tab, Take 1 tablet (150 mg total) by mouth once. for 1 dose, Disp: 1 tablet, Rfl: 0    ibuprofen (ADVIL,MOTRIN) 600 MG tablet, Take 1 tablet (600 mg total) by mouth every 6 (six) hours. (Patient not taking:  Reported on 2024), Disp: 30 tablet, Rfl: 1    norethindrone-ethinyl estradiol (MICROGESTIN ) 1-20 mg-mcg per tablet, Take 1 tablet by mouth once daily., Disp: 21 tablet, Rfl: 11    venlafaxine (EFFEXOR-XR) 75 MG 24 hr capsule, Take 1 capsule (75 mg total) by mouth once daily. Total effexor 225mg daily, Disp: 30 capsule, Rfl: 11  Review of patient's allergies indicates:  No Known Allergies  OB History    Para Term  AB Living   1 1 1 0 0 1   SAB IAB Ectopic Multiple Live Births   0 0 0 0 1      # Outcome Date GA Lbr Ajay/2nd Weight Sex Type Anes PTL Lv   1 Term 24 37w6d  3.29 kg (7 lb 4.1 oz) F Vag-Spont EPI N J LUIS     Social History     Tobacco Use    Smoking status: Never    Smokeless tobacco: Never   Substance Use Topics    Alcohol use: Not Currently     Alcohol/week: 4.0 standard drinks of alcohol     Types: 4 Glasses of wine per week     Comment: socially; 2 drinks 3 times per week on average    Drug use: No     Family History   Problem Relation Name Age of Onset    No Known Problems Mother      Diabetes Father          2 - resolved w weight loss    No Known Problems Sister      No Known Problems Brother      No Known Problems Maternal Grandmother      Lung cancer Maternal Grandfather          smoker    Breast cancer Neg Hx      Colon cancer Neg Hx      Heart attack Neg Hx      Melanoma Neg Hx         Review of Systems   Negative except as in HPI    Physical Exam   Vitals:    24 1314   BP: 112/82     Body mass index is 23.63 kg/m².    Physical Exam  Constitutional:       General: She is not in acute distress.     Appearance: Normal appearance.     Genitourinary:    Vulva, uterus, right adnexa, left adnexa and urethral meatus normal.   The external female genitalia was normal.   No external genitalia lesions identified,Genitalia hair distrobution normal .         There is vaginal discharge (clumpy yellowish discharge in vault) in the vagina. No bleeding in the vagina.    No vaginal  prolapse present.     No vaginal atrophy present.  Right adnexum displays no mass, no tenderness and no fullness. Left adnexum displays no mass, no tenderness and no fullness. Cervix is normal. Cervix exhibits no motion tenderness and no lesion. Uterus consistancy normal and Uerus contour normal  Uterus is not tender and no mass.    Uterus is anteverted.   HENT:      Head: Normocephalic and atraumatic.   Eyes:      Extraocular Movements: Extraocular movements intact.      Pupils: Pupils are equal, round, and reactive to light.   Pulmonary:      Effort: Pulmonary effort is normal.   Abdominal:      General: Abdomen is flat. There is no distension.      Palpations: Abdomen is soft.      Tenderness: There is no abdominal tenderness. There is no guarding or rebound.   Musculoskeletal:         General: Normal range of motion.      Cervical back: Normal range of motion.   Neurological:      General: No focal deficit present.      Mental Status: She is alert and oriented to person, place, and time.   Skin:     General: Skin is warm and dry.   Psychiatric:         Mood and Affect: Mood normal.         Behavior: Behavior normal.         Thought Content: Thought content normal.   Vitals reviewed.          ASSESSMENT:   Patient Active Problem List   Diagnosis    Generalized anxiety disorder with panic attacks    Adjustment disorder with mixed anxiety and depressed mood    Anemia during pregnancy in third trimester    Benign gestational thrombocytopenia in third trimester     (spontaneous vaginal delivery)    Postpartum care and examination       PLAN:  Problem List Items Addressed This Visit          Psychiatric    Generalized anxiety disorder with panic attacks       Hematology    Benign gestational thrombocytopenia in third trimester       Obstetric     (spontaneous vaginal delivery)    Postpartum care and examination - Primary    Current Assessment & Plan     Normal pelvic exam, lacerations well healed but with some  granulation and significant discharge. Will send Rx for Diflucan. Recommend pelvic rest for another week and use of water based lubricant to avoid friction. Formula feeding. OCPs for contraception, no absolute contraindications. Counseled about risks of unintended pregnancy, safe pregnancy spacing, and continuing PNV. No signs of PP depression, EPDS score 7. Pap up to date.     CBC today for h/o GTP.              Other Visit Diagnoses       Encounter for counseling regarding contraception        Relevant Medications    norethindrone-ethinyl estradiol (MICROGESTIN 1/20) 1-20 mg-mcg per tablet    Yeast vaginitis        Relevant Medications    fluconazole (DIFLUCAN) 150 MG Tab             Follow up if symptoms worsen or fail to improve.       Brittanie Ag MD  Department of Obstetrics & Gynecology  [facility]

## 2024-10-11 ENCOUNTER — OFFICE VISIT (OUTPATIENT)
Dept: INTERNAL MEDICINE | Facility: CLINIC | Age: 34
End: 2024-10-11
Payer: COMMERCIAL

## 2024-10-11 VITALS
HEART RATE: 60 BPM | RESPIRATION RATE: 18 BRPM | WEIGHT: 173.63 LBS | BODY MASS INDEX: 24.86 KG/M2 | SYSTOLIC BLOOD PRESSURE: 114 MMHG | HEIGHT: 70 IN | DIASTOLIC BLOOD PRESSURE: 82 MMHG | TEMPERATURE: 98 F | OXYGEN SATURATION: 98 %

## 2024-10-11 DIAGNOSIS — Z00.00 HEALTH CARE MAINTENANCE: Primary | ICD-10-CM

## 2024-10-11 DIAGNOSIS — F41.9 ANXIETY: ICD-10-CM

## 2024-10-11 DIAGNOSIS — Z23 NEED FOR VACCINATION: ICD-10-CM

## 2024-10-11 DIAGNOSIS — F41.1 GENERALIZED ANXIETY DISORDER WITH PANIC ATTACKS: ICD-10-CM

## 2024-10-11 DIAGNOSIS — F43.23 ADJUSTMENT DISORDER WITH MIXED ANXIETY AND DEPRESSED MOOD: ICD-10-CM

## 2024-10-11 DIAGNOSIS — F41.0 GENERALIZED ANXIETY DISORDER WITH PANIC ATTACKS: ICD-10-CM

## 2024-10-11 PROCEDURE — 99999 PR PBB SHADOW E&M-EST. PATIENT-LVL IV: CPT | Mod: PBBFAC,,, | Performed by: INTERNAL MEDICINE

## 2024-10-11 RX ORDER — VENLAFAXINE HYDROCHLORIDE 75 MG/1
75 CAPSULE, EXTENDED RELEASE ORAL DAILY
Qty: 90 CAPSULE | Refills: 3 | Status: SHIPPED | OUTPATIENT
Start: 2024-10-11 | End: 2025-10-11

## 2024-10-11 RX ORDER — VENLAFAXINE HYDROCHLORIDE 150 MG/1
150 CAPSULE, EXTENDED RELEASE ORAL DAILY
Qty: 90 CAPSULE | Refills: 3 | Status: SHIPPED | OUTPATIENT
Start: 2024-10-11

## 2024-10-11 NOTE — PROGRESS NOTES
"Subjective:       Patient ID: Norma Manjarrez is a 33 y.o. female.    Chief Complaint: Annual Exam    HPI  Norma Manjarrez is a 33 y.o. female here for a yearly preventative healthcare visit.     Baby girl, Gissel, born 7/[birth year].     JAZMYNE  Effexor 225mg daily     for 6 weeks.   Now on formula.   She is able to get 7 hours of sleep. She and  alternate midnight wake up.   Goes back 11/11 to work.    Review of Systems   Constitutional:  Negative for fever.   Respiratory:  Negative for shortness of breath.    Cardiovascular:  Negative for chest pain.   Musculoskeletal: Negative.    Skin: Negative.        Objective:   /82   Pulse 60   Temp 97.7 °F (36.5 °C) (Oral)   Resp 18   Ht 5' 10" (1.778 m)   Wt 78.8 kg (173 lb 9.8 oz)   LMP 11/09/2023 (Exact Date)   SpO2 98%   Breastfeeding No   BMI 24.91 kg/m²      Physical Exam  Vitals reviewed.   Constitutional:       Appearance: She is well-developed.   HENT:      Head: Normocephalic and atraumatic.   Eyes:      Conjunctiva/sclera: Conjunctivae normal.      Pupils: Pupils are equal, round, and reactive to light.   Neck:      Thyroid: No thyromegaly.   Cardiovascular:      Rate and Rhythm: Normal rate and regular rhythm.      Heart sounds: Normal heart sounds. No murmur heard.  Pulmonary:      Effort: Pulmonary effort is normal. No respiratory distress.      Breath sounds: Normal breath sounds. No wheezing.   Abdominal:      General: Bowel sounds are normal. There is no distension.      Palpations: Abdomen is soft.      Tenderness: There is no abdominal tenderness.   Musculoskeletal:         General: Normal range of motion.      Cervical back: Neck supple.   Skin:     General: Skin is warm and dry.      Findings: No rash.   Neurological:      General: No focal deficit present.      Mental Status: She is alert and oriented to person, place, and time.   Psychiatric:         Thought Content: Thought content normal.         Judgment: " Judgment normal.         Assessment:       No diagnosis found.    Plan:       There are no diagnoses linked to this encounter.      You are up to date for your primary preventive health care, and there are no reminders at this time.     Flu vaccine today  Pregnancy labs reviewed  Next labs and annual 2025

## 2025-02-14 ENCOUNTER — OFFICE VISIT (OUTPATIENT)
Dept: OBSTETRICS AND GYNECOLOGY | Facility: CLINIC | Age: 35
End: 2025-02-14
Payer: COMMERCIAL

## 2025-02-14 ENCOUNTER — PATIENT MESSAGE (OUTPATIENT)
Dept: OBSTETRICS AND GYNECOLOGY | Facility: CLINIC | Age: 35
End: 2025-02-14

## 2025-02-14 VITALS
DIASTOLIC BLOOD PRESSURE: 66 MMHG | WEIGHT: 176.81 LBS | BODY MASS INDEX: 25.37 KG/M2 | SYSTOLIC BLOOD PRESSURE: 116 MMHG

## 2025-02-14 DIAGNOSIS — N92.6 IRREGULAR MENSTRUAL BLEEDING: Primary | ICD-10-CM

## 2025-02-14 DIAGNOSIS — Z32.02 NEGATIVE PREGNANCY TEST: ICD-10-CM

## 2025-02-14 LAB
B-HCG UR QL: NEGATIVE
CTP QC/QA: YES

## 2025-02-14 PROCEDURE — 99999 PR PBB SHADOW E&M-EST. PATIENT-LVL III: CPT | Mod: PBBFAC,,, | Performed by: OBSTETRICS & GYNECOLOGY

## 2025-02-14 PROCEDURE — 99214 OFFICE O/P EST MOD 30 MIN: CPT | Mod: S$GLB,,, | Performed by: OBSTETRICS & GYNECOLOGY

## 2025-02-14 RX ORDER — NORETHINDRONE ACETATE AND ETHINYL ESTRADIOL .03; 1.5 MG/1; MG/1
1 TABLET ORAL DAILY
Qty: 84 TABLET | Refills: 4 | Status: SHIPPED | OUTPATIENT
Start: 2025-02-14 | End: 2026-02-14

## 2025-02-14 NOTE — PROGRESS NOTES
Chief Complaint   Patient presents with    Metrorrhagia       HPI:   34 y.o.  here today for c/o irregular bleeding. Taking Microgestin, no missed pills. Usually does not have bleeding during placebo week, but has been noticing more BTB during active pills. No longer breast feeding. No new partners or concern for infection. Pap UTD. UPT negative in clinic today.     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 Total effexor 225mg daily, Disp: 90 capsule, Rfl: 3    norethindrone ac-eth estradioL (JUNEL 1.5/30, 21,) 1.5-30 mg-mcg Tab, Take 1 tablet by mouth once daily., Disp: 84 tablet, Rfl: 4  Review of patient's allergies indicates:  No Known Allergies  OB History    Para Term  AB Living   1 1 1 0 0 1   SAB IAB Ectopic Multiple Live Births   0 0 0 0 1      # Outcome Date GA Lbr Ajay/2nd Weight Sex Type Anes PTL Lv   1 Term 24 37w6d  3.29 kg (7 lb 4.1 oz) F Vag-Spont EPI N J LUIS     Social History     Tobacco Use    Smoking status: Never    Smokeless tobacco: Never   Substance Use Topics    Alcohol use: Not Currently     Alcohol/week: 4.0 standard drinks of alcohol     Types: 4 Glasses of wine per week     Comment: socially; 2 drinks 3 times per week on average    Drug use: No     Family History   Problem Relation Name Age of Onset    No Known Problems Mother      Diabetes Father          2  - resolved w weight loss    No Known Problems Sister      No Known Problems Brother      No Known Problems Maternal Grandmother      Lung cancer Maternal Grandfather          smoker    Breast cancer Neg Hx      Colon cancer Neg Hx      Heart attack Neg Hx      Melanoma Neg Hx         Review of Systems   Negative except as in HPI    Physical Exam   Vitals:    02/14/25 1343   BP: 116/66     Body mass index is 25.37 kg/m².    Physical Exam  Constitutional:       General: She is not in acute distress.     Appearance: Normal appearance.     Genitourinary:    Vulva, vagina, uterus, right adnexa, left adnexa and urethral meatus normal.   The external female genitalia was normal.   No external genitalia lesions identified,Genitalia hair distrobution normal .   No vaginal discharge or bleeding in the vagina.    No vaginal prolapse present.     No vaginal atrophy present.  Right adnexum displays no mass, no tenderness and no fullness. Left adnexum displays no mass, no tenderness and no fullness. Cervix is normal. Cervix exhibits no motion tenderness and no lesion. Uterus consistancy normal and Uerus contour normal  Uterus is not tender and no mass.    Uterus is anteverted.     HENT:      Head: Normocephalic and atraumatic.   Eyes:      Extraocular Movements: Extraocular movements intact.      Pupils: Pupils are equal, round, and reactive to light.   Pulmonary:      Effort: Pulmonary effort is normal.   Abdominal:      General: Abdomen is flat. There is no distension.      Palpations: Abdomen is soft.      Tenderness: There is no abdominal tenderness. There is no guarding or rebound.   Musculoskeletal:         General: Normal range of motion.      Cervical back: Normal range of motion.   Neurological:      General: No focal deficit present.      Mental Status: She is alert and oriented to person, place, and time.   Skin:     General: Skin is warm and dry.   Psychiatric:         Mood and Affect: Mood normal.         Behavior:  Behavior normal.         Thought Content: Thought content normal.   Vitals reviewed.        Labs reviewed: UPT, Pap, HPV    ASSESSMENT:   Patient Active Problem List   Diagnosis    Generalized anxiety disorder with panic attacks    Adjustment disorder with mixed anxiety and depressed mood    Anemia during pregnancy in third trimester    Benign gestational thrombocytopenia in third trimester     (spontaneous vaginal delivery)    Postpartum care and examination    Irregular menstrual bleeding       PLAN:  Problem List Items Addressed This Visit          Renal/    Irregular menstrual bleeding - Primary    Current Assessment & Plan   Will increase OCP to one with 30 mcg EE, as she is currently taking 20 mcg. Contact the office with no improvement after 2-3 pill packs. Negative UPT, normal pelvic exam.          Relevant Medications    norethindrone ac-eth estradioL (JUNEL 1.5/30, ,) 1.5-30 mg-mcg Tab     Other Visit Diagnoses         Negative pregnancy test        Relevant Orders    POCT Urine Pregnancy (Completed)             Total time spent on this encounter was 10 minutes.  This includes preparing to see the patient;  obtaining/reviewing separately obtained history;  performing a medical exam and/or evaluation;   counseling/educating the patient;  ordering medications, tests, of procedures;   referring/communicating with other health care professionals;  EMR documentation;  interpreting/communicating results to the patient;  and/or care coordination.    Follow up if symptoms worsen or fail to improve.       Brittanie Ag MD  Department of Obstetrics & Gynecology  [facility]

## 2025-02-16 PROBLEM — N92.6 IRREGULAR MENSTRUAL BLEEDING: Status: ACTIVE | Noted: 2025-02-16

## 2025-02-16 NOTE — ASSESSMENT & PLAN NOTE
Will increase OCP to one with 30 mcg EE, as she is currently taking 20 mcg. Contact the office with no improvement after 2-3 pill packs. Negative UPT, normal pelvic exam.

## 2025-02-24 ENCOUNTER — OFFICE VISIT (OUTPATIENT)
Dept: INTERNAL MEDICINE | Facility: CLINIC | Age: 35
End: 2025-02-24
Payer: COMMERCIAL

## 2025-02-24 VITALS
HEIGHT: 70 IN | BODY MASS INDEX: 25 KG/M2 | HEART RATE: 91 BPM | DIASTOLIC BLOOD PRESSURE: 62 MMHG | WEIGHT: 174.63 LBS | OXYGEN SATURATION: 98 % | TEMPERATURE: 99 F | SYSTOLIC BLOOD PRESSURE: 112 MMHG

## 2025-02-24 DIAGNOSIS — R11.2 NAUSEA AND VOMITING, UNSPECIFIED VOMITING TYPE: Primary | ICD-10-CM

## 2025-02-24 DIAGNOSIS — R52 BODY ACHES: ICD-10-CM

## 2025-02-24 DIAGNOSIS — R50.9 FEVER, UNSPECIFIED FEVER CAUSE: ICD-10-CM

## 2025-02-24 LAB
CTP QC/QA: YES
CTP QC/QA: YES
POC MOLECULAR INFLUENZA A AGN: NEGATIVE
POC MOLECULAR INFLUENZA B AGN: NEGATIVE
SARS-COV-2 RDRP RESP QL NAA+PROBE: NEGATIVE

## 2025-02-24 PROCEDURE — 3008F BODY MASS INDEX DOCD: CPT | Mod: CPTII,S$GLB,, | Performed by: INTERNAL MEDICINE

## 2025-02-24 PROCEDURE — 3074F SYST BP LT 130 MM HG: CPT | Mod: CPTII,S$GLB,, | Performed by: INTERNAL MEDICINE

## 2025-02-24 PROCEDURE — 1160F RVW MEDS BY RX/DR IN RCRD: CPT | Mod: CPTII,S$GLB,, | Performed by: INTERNAL MEDICINE

## 2025-02-24 PROCEDURE — 99213 OFFICE O/P EST LOW 20 MIN: CPT | Mod: S$GLB,,, | Performed by: INTERNAL MEDICINE

## 2025-02-24 PROCEDURE — 3078F DIAST BP <80 MM HG: CPT | Mod: CPTII,S$GLB,, | Performed by: INTERNAL MEDICINE

## 2025-02-24 PROCEDURE — 1159F MED LIST DOCD IN RCRD: CPT | Mod: CPTII,S$GLB,, | Performed by: INTERNAL MEDICINE

## 2025-02-24 PROCEDURE — 87635 SARS-COV-2 COVID-19 AMP PRB: CPT | Mod: QW,S$GLB,, | Performed by: INTERNAL MEDICINE

## 2025-02-24 PROCEDURE — 87502 INFLUENZA DNA AMP PROBE: CPT | Mod: QW,S$GLB,, | Performed by: INTERNAL MEDICINE

## 2025-02-24 PROCEDURE — 99999 PR PBB SHADOW E&M-EST. PATIENT-LVL III: CPT | Mod: PBBFAC,,, | Performed by: INTERNAL MEDICINE

## 2025-02-24 NOTE — PROGRESS NOTES
"Subjective:       Patient ID: Norma Manjarrez is a 34 y.o. female.    Chief Complaint: Emesis (Body ache /Fever /)    HPI    34 y.o. female here for evaluation of feelign sick since yesterday. Sister also throwing up. Baby had recent diarrhea illness.   Her first symtpom was vomiting. Had leftover phenergan from pregnancy which helped. Then fever, body aches, sweats. Last vomiting last night midnight. Body aches. Had flu shot, no known flu contacts but was at [employer].     Baby girl, Gissel, born [birth year].      JAZMYNE  Effexor 225mg daily  Review of Systems   Constitutional:  Positive for fever.   Respiratory:  Negative for shortness of breath.    Cardiovascular:  Negative for chest pain.   Gastrointestinal:  Positive for nausea and vomiting. Negative for abdominal pain and diarrhea.   Musculoskeletal:  Positive for myalgias.   Skin: Negative.        Objective:   /62 (BP Location: Left arm, Patient Position: Sitting)   Pulse 91   Temp 99 °F (37.2 °C)   Ht 5' 10" (1.778 m)   Wt 79.2 kg (174 lb 9.7 oz)   LMP 02/08/2025 (Exact Date)   SpO2 98%   BMI 25.05 kg/m²      Physical Exam  Constitutional:       General: She is not in acute distress.     Appearance: She is well-developed. She is ill-appearing. She is not diaphoretic.   HENT:      Head: Normocephalic and atraumatic.   Cardiovascular:      Rate and Rhythm: Normal rate and regular rhythm.   Pulmonary:      Effort: Pulmonary effort is normal. No respiratory distress.      Breath sounds: No wheezing or rales.   Abdominal:      Palpations: There is no mass.      Tenderness: There is abdominal tenderness. There is no guarding or rebound.   Skin:     General: Skin is warm and dry.   Neurological:      Mental Status: She is alert and oriented to person, place, and time.   Psychiatric:         Behavior: Behavior normal.         Assessment:       1. Nausea and vomiting, unspecified vomiting type    2. Body aches    3. Fever, unspecified fever cause      "   Plan:       Nausea and vomiting, unspecified vomiting type  Body aches  Fever, unspecified fever cause  -     POCT Influenza A/B Molecular  -     POCT COVID-19 Rapid Screening  Both negative  Viral illness with predominant GI symptoms  Recommend rest, hydration  Contact clinic if sx fail to improve        You are up to date for your primary preventive health care, and there are no reminders at this time.

## 2025-06-10 ENCOUNTER — PATIENT MESSAGE (OUTPATIENT)
Dept: INTERNAL MEDICINE | Facility: CLINIC | Age: 35
End: 2025-06-10
Payer: COMMERCIAL

## 2025-06-11 ENCOUNTER — OFFICE VISIT (OUTPATIENT)
Dept: INTERNAL MEDICINE | Facility: CLINIC | Age: 35
End: 2025-06-11
Payer: COMMERCIAL

## 2025-06-11 VITALS
BODY MASS INDEX: 24.54 KG/M2 | OXYGEN SATURATION: 97 % | WEIGHT: 171.44 LBS | HEART RATE: 100 BPM | HEIGHT: 70 IN | DIASTOLIC BLOOD PRESSURE: 60 MMHG | SYSTOLIC BLOOD PRESSURE: 100 MMHG

## 2025-06-11 DIAGNOSIS — R52 BODY ACHES: ICD-10-CM

## 2025-06-11 DIAGNOSIS — R50.9 FEVER, UNSPECIFIED FEVER CAUSE: ICD-10-CM

## 2025-06-11 DIAGNOSIS — F51.01 PRIMARY INSOMNIA: ICD-10-CM

## 2025-06-11 DIAGNOSIS — B34.9 VIRAL ILLNESS: Primary | ICD-10-CM

## 2025-06-11 PROCEDURE — 99999 PR PBB SHADOW E&M-EST. PATIENT-LVL III: CPT | Mod: PBBFAC,,, | Performed by: INTERNAL MEDICINE

## 2025-06-11 RX ORDER — CLONAZEPAM 0.5 MG/1
0.5 TABLET ORAL DAILY PRN
Qty: 30 TABLET | Refills: 0 | Status: SHIPPED | OUTPATIENT
Start: 2025-06-11 | End: 2026-06-11

## 2025-06-11 NOTE — PROGRESS NOTES
"Subjective:       Patient ID: Norma Manjarrez is a 34 y.o. female.    Chief Complaint: Generalized Body Aches (Low fever /Headaches /Took a home covid and was negative )    HPI  34 y.o. female here for evaluation of body aches, fever, headaches.     Effexor 225mg daily - taking consistently    Message yesterday:   I've been taking both of my Effexor pills as usual but for the past few days, I've been experiencing withdrawal like symptoms. Horrible body aches and joint pain, night sweats and insomnia. Could this still happen if I have been diligent with my medication? The only other med I take is my birth control.   Insomnia for the past three nights and have been awake since 2AM.   Clonazepam 0.5mg previously effective for rare use - has not filled in years.     ------------  Started Sunday night (today Wednesday)with night sweats, subjecvtive fever, not sleeping. Temp to 100 the first night. Wire and exhausted. No congestion, no cough. Body aches. No GI issues. Daughter is in  but asymptomatic,  has mild uri sx. Taking extra strength tylenol.   No sore throat nor neck swelling.     She is under extra stress as they are moving to [facility]    Review of Systems   Constitutional:  Negative for fever.   Respiratory:  Negative for shortness of breath.    Cardiovascular:  Negative for chest pain.   Musculoskeletal:  Positive for myalgias.   Skin: Negative.    Psychiatric/Behavioral:  Positive for sleep disturbance.        Objective:   /60 (BP Location: Left arm, Patient Position: Sitting)   Pulse 100   Ht 5' 10" (1.778 m)   Wt 77.7 kg (171 lb 6.5 oz)   SpO2 97%   BMI 24.59 kg/m²      Physical Exam  Constitutional:       General: She is not in acute distress.     Appearance: She is well-developed. She is not diaphoretic.   HENT:      Head: Normocephalic and atraumatic.      Right Ear: Tympanic membrane normal.      Left Ear: Tympanic membrane normal.      " Mouth/Throat:      Pharynx: No oropharyngeal exudate or posterior oropharyngeal erythema.   Cardiovascular:      Rate and Rhythm: Normal rate and regular rhythm.   Pulmonary:      Effort: Pulmonary effort is normal. No respiratory distress.      Breath sounds: No wheezing or rales.   Abdominal:      Palpations: Abdomen is soft.      Tenderness: There is no abdominal tenderness.   Skin:     General: Skin is warm and dry.   Neurological:      Mental Status: She is alert and oriented to person, place, and time.   Psychiatric:         Behavior: Behavior normal.         Assessment:       1. Viral illness    2. Body aches    3. Fever, unspecified fever cause    4. Primary insomnia        Plan:       Viral illness  Body aches  Fever, unspecified fever cause  -     POCT COVID-19 Rapid Screening - negative  -     POCT Influenza A/B Molecular - negative  Rest, fluids, tylenol prn  If fails to improve in 5 days please contact clinic    Primary insomnia  -     clonazePAM (KLONOPIN) 0.5 MG tablet; Take 1 tablet (0.5 mg total) by mouth daily as needed for Anxiety (insomnia).  Dispense: 30 tablet; Refill: 0          You are up to date for your primary preventive health care, and there are no reminders at this time.

## 2025-07-29 ENCOUNTER — PATIENT MESSAGE (OUTPATIENT)
Dept: INTERNAL MEDICINE | Facility: CLINIC | Age: 35
End: 2025-07-29
Payer: COMMERCIAL

## 2025-07-29 DIAGNOSIS — F41.9 ANXIETY: ICD-10-CM

## 2025-07-29 DIAGNOSIS — F41.0 GENERALIZED ANXIETY DISORDER WITH PANIC ATTACKS: ICD-10-CM

## 2025-07-29 DIAGNOSIS — F41.1 GENERALIZED ANXIETY DISORDER WITH PANIC ATTACKS: ICD-10-CM

## 2025-07-29 DIAGNOSIS — F43.23 ADJUSTMENT DISORDER WITH MIXED ANXIETY AND DEPRESSED MOOD: ICD-10-CM

## 2025-07-29 RX ORDER — VENLAFAXINE HYDROCHLORIDE 150 MG/1
150 CAPSULE, EXTENDED RELEASE ORAL DAILY
Qty: 90 CAPSULE | Refills: 3 | Status: SHIPPED | OUTPATIENT
Start: 2025-07-29

## 2025-07-29 RX ORDER — VENLAFAXINE HYDROCHLORIDE 75 MG/1
75 CAPSULE, EXTENDED RELEASE ORAL DAILY
Qty: 90 CAPSULE | Refills: 3 | Status: SHIPPED | OUTPATIENT
Start: 2025-07-29 | End: 2026-07-29

## 2025-07-29 NOTE — TELEPHONE ENCOUNTER
Care Due:                  Date            Visit Type   Department     Provider  --------------------------------------------------------------------------------                                EP -                              PRIMARY      [facility]  Last Visit: 06-      CARE ([facility])   MEDICINE       Krupa Rodriguez  Next Visit: None Scheduled  None         None Found                                                            Last  Test          Frequency    Reason                     Performed    Due Date  --------------------------------------------------------------------------------    Cr..........  12 months..  venlafaxine..............  01- 01-    [facility] Embedded Care Due Messages. Reference number: [id number].   7/29/2025 12:08:17 PM CDT

## 2025-07-29 NOTE — TELEPHONE ENCOUNTER
Patient requesting refill on   Effexor 150 mg  Effexor 75 mg  Pt's  LOV with Krupa Rodriguez MD , 6/11/2025  Medication pending for your review